# Patient Record
Sex: MALE | URBAN - METROPOLITAN AREA
[De-identification: names, ages, dates, MRNs, and addresses within clinical notes are randomized per-mention and may not be internally consistent; named-entity substitution may affect disease eponyms.]

---

## 2017-03-14 ENCOUNTER — EMPLOYEE WELLNESS (OUTPATIENT)
Dept: OTHER | Age: 52
End: 2017-03-14

## 2017-03-14 LAB
CHOLESTEROL/HDL RATIO: 3.7
CHOLESTEROL: 182 MG/DL
GLUCOSE BLD-MCNC: 95 MG/DL (ref 70–99)
HDLC SERPL-MCNC: 49 MG/DL
LDL CHOLESTEROL: 106 MG/DL (ref 0–130)
PATIENT FASTING?: YES
TRIGL SERPL-MCNC: 135 MG/DL
VLDLC SERPL CALC-MCNC: NORMAL MG/DL (ref 1–30)

## 2017-09-11 RX ORDER — LISINOPRIL 20 MG/1
TABLET ORAL
Qty: 30 TABLET | Refills: 6 | Status: SHIPPED | OUTPATIENT
Start: 2017-09-11 | End: 2018-03-12 | Stop reason: SDUPTHER

## 2018-03-06 DIAGNOSIS — E78.5 HYPERLIPIDEMIA, UNSPECIFIED HYPERLIPIDEMIA TYPE: ICD-10-CM

## 2018-03-06 RX ORDER — ATORVASTATIN CALCIUM 20 MG/1
TABLET, FILM COATED ORAL
Qty: 90 TABLET | Refills: 1 | OUTPATIENT
Start: 2018-03-06

## 2018-03-06 RX ORDER — LISINOPRIL 20 MG/1
TABLET ORAL
Qty: 30 TABLET | Refills: 6 | OUTPATIENT
Start: 2018-03-06

## 2018-03-12 ENCOUNTER — OFFICE VISIT (OUTPATIENT)
Dept: FAMILY MEDICINE CLINIC | Age: 53
End: 2018-03-12
Payer: COMMERCIAL

## 2018-03-12 VITALS
DIASTOLIC BLOOD PRESSURE: 80 MMHG | WEIGHT: 184 LBS | OXYGEN SATURATION: 98 % | HEIGHT: 76 IN | TEMPERATURE: 98.2 F | HEART RATE: 89 BPM | BODY MASS INDEX: 22.41 KG/M2 | SYSTOLIC BLOOD PRESSURE: 124 MMHG

## 2018-03-12 DIAGNOSIS — M19.042 PRIMARY OSTEOARTHRITIS OF BOTH HANDS: ICD-10-CM

## 2018-03-12 DIAGNOSIS — Z71.6 TOBACCO ABUSE COUNSELING: ICD-10-CM

## 2018-03-12 DIAGNOSIS — E78.2 MIXED HYPERLIPIDEMIA: ICD-10-CM

## 2018-03-12 DIAGNOSIS — M19.041 PRIMARY OSTEOARTHRITIS OF BOTH HANDS: ICD-10-CM

## 2018-03-12 DIAGNOSIS — E78.5 HYPERLIPIDEMIA, UNSPECIFIED HYPERLIPIDEMIA TYPE: ICD-10-CM

## 2018-03-12 DIAGNOSIS — Z12.5 PROSTATE CANCER SCREENING: ICD-10-CM

## 2018-03-12 DIAGNOSIS — I10 ESSENTIAL HYPERTENSION: Primary | ICD-10-CM

## 2018-03-12 PROCEDURE — 99406 BEHAV CHNG SMOKING 3-10 MIN: CPT | Performed by: PHYSICIAN ASSISTANT

## 2018-03-12 PROCEDURE — 99213 OFFICE O/P EST LOW 20 MIN: CPT | Performed by: PHYSICIAN ASSISTANT

## 2018-03-12 RX ORDER — IBUPROFEN 800 MG/1
800 TABLET ORAL EVERY 8 HOURS PRN
Qty: 90 TABLET | Refills: 0 | Status: CANCELLED | OUTPATIENT
Start: 2018-03-12 | End: 2018-06-10

## 2018-03-12 RX ORDER — ATORVASTATIN CALCIUM 20 MG/1
20 TABLET, FILM COATED ORAL DAILY
Qty: 90 TABLET | Refills: 1 | Status: SHIPPED | OUTPATIENT
Start: 2018-03-12 | End: 2019-01-28 | Stop reason: SDUPTHER

## 2018-03-12 RX ORDER — LISINOPRIL 20 MG/1
TABLET ORAL
Qty: 90 TABLET | Refills: 1 | Status: SHIPPED | OUTPATIENT
Start: 2018-03-12 | End: 2018-04-16 | Stop reason: SDUPTHER

## 2018-03-12 ASSESSMENT — PATIENT HEALTH QUESTIONNAIRE - PHQ9
1. LITTLE INTEREST OR PLEASURE IN DOING THINGS: 0
SUM OF ALL RESPONSES TO PHQ9 QUESTIONS 1 & 2: 0
2. FEELING DOWN, DEPRESSED OR HOPELESS: 0
SUM OF ALL RESPONSES TO PHQ QUESTIONS 1-9: 0

## 2018-03-12 ASSESSMENT — ENCOUNTER SYMPTOMS
CONSTIPATION: 0
VOMITING: 0
SHORTNESS OF BREATH: 0
DIARRHEA: 0
WHEEZING: 0
COLOR CHANGE: 0
COUGH: 0
ABDOMINAL PAIN: 0
NAUSEA: 0

## 2018-03-12 NOTE — PROGRESS NOTES
[address] [address]  Dept: [phone]  Dept Fax: [phone]    Giselle Juarez is a 47 y.o. male who presents today for his medical conditions/complaints as noted below. Giselle Juarez is c/o of   Chief Complaint   Patient presents with    Hypertension     Patient here for follow up on HTN         HPI:     Hypertension   This is a chronic problem. The current episode started more than 1 year ago. The problem is unchanged. The problem is controlled. Pertinent negatives include no anxiety, chest pain, palpitations, peripheral edema or shortness of breath. Risk factors for coronary artery disease include male gender and family history. Past treatments include ACE inhibitors. The current treatment provides significant improvement. patient states feeling well  Continues to smoke over 1 ppd  He reports feeling well  Working 7 days a week  He has been having pain in the hands bilaterally due to working with hands daily.  Requesting cream to use for pain    No results found for: LABA1C          ( goal A1C is < 7)   No results found for: LABMICR  LDL Cholesterol (mg/dL)   Date Value   09/15/2015 139 (H)   10/14/2014 101   05/14/2014 132 (H)       (goal LDL is <100)   AST (U/L)   Date Value   09/17/2015 21     ALT (U/L)   Date Value   09/17/2015 25     BUN (mg/dL)   Date Value   09/17/2015 14     BP Readings from Last 3 Encounters:   03/12/18 124/80   09/13/16 124/84   03/17/16 132/84          (goal 120/80)    Past Medical History:   Diagnosis Date    Hyperlipidemia     Hypertension       Past Surgical History:   Procedure Laterality Date    ROTATOR CUFF REPAIR  2011       Family History   Problem Relation Age of Onset    Alzheimer's Disease Mother        Social History   Substance Use Topics    Smoking status: Current Every Day Smoker     Packs/day: 0.50     Years: 15.00     Types: Cigarettes    Smokeless tobacco: Never Used   [facility] Alcohol use Yes      Comment: socially      Current Outpatient Prescriptions   Medication Sig Dispense Refill    atorvastatin (LIPITOR) 20 MG tablet Take 1 tablet by mouth daily 90 tablet 1    lisinopril (PRINIVIL;ZESTRIL) 20 MG tablet TAKE 1 TABLET BY MOUTH ONCE DAILY. 90 tablet 1    diclofenac sodium (VOLTAREN) 1 % GEL Apply 2 g topically 4 times daily as needed for Pain For upper extremity, use 2 gm QID. For lower extremity, use 4 gm QID. 10 Tube 1     No current facility-administered medications for this visit. No Known Allergies    Health Maintenance   Topic Date Due    Hepatitis C screen  1965    HIV screen  09/26/1979    DTaP/Tdap/Td vaccine (1 - Tdap) 09/26/1983    Shingles Vaccine (1 of 2 - 2 Dose Series) 09/26/2014    Potassium monitoring  09/17/2016    Creatinine monitoring  09/17/2016    Flu vaccine (1) 09/01/2017    Lipid screen  09/15/2020    Colon cancer screen colonoscopy  09/17/2020    Pneumococcal med risk  Completed       Subjective:      Review of Systems   Constitutional: Negative for activity change, appetite change, fatigue, fever and unexpected weight change. /80   Pulse 89   Temp 98.2 °F (36.8 °C) (Oral)   Ht 6' 4\" (1.93 m)   Wt 184 lb (83.5 kg)   SpO2 98%   BMI 22.40 kg/m²    Respiratory: Negative for cough, shortness of breath and wheezing. Cardiovascular: Negative for chest pain, palpitations and leg swelling. Gastrointestinal: Negative for abdominal pain, constipation, diarrhea, nausea and vomiting. Genitourinary: Negative for dysuria. Musculoskeletal: Positive for arthralgias. Skin: Negative for color change, pallor, rash and wound. Neurological: Negative for weakness. Hematological: Negative for adenopathy. Psychiatric/Behavioral: Negative for sleep disturbance. The patient is not nervous/anxious. Objective:     Physical Exam   Constitutional: He is oriented to person, place, and time.  He appears well-developed and

## 2018-03-20 VITALS — WEIGHT: 191 LBS

## 2018-04-16 ENCOUNTER — PATIENT MESSAGE (OUTPATIENT)
Dept: FAMILY MEDICINE CLINIC | Age: 53
End: 2018-04-16

## 2018-04-16 DIAGNOSIS — M19.042 PRIMARY OSTEOARTHRITIS OF BOTH HANDS: ICD-10-CM

## 2018-04-16 DIAGNOSIS — M19.041 PRIMARY OSTEOARTHRITIS OF BOTH HANDS: ICD-10-CM

## 2018-04-16 DIAGNOSIS — I10 ESSENTIAL HYPERTENSION: ICD-10-CM

## 2018-04-16 RX ORDER — LISINOPRIL 20 MG/1
TABLET ORAL
Qty: 90 TABLET | Refills: 1 | Status: SHIPPED | OUTPATIENT
Start: 2018-04-16 | End: 2018-07-31 | Stop reason: SDUPTHER

## 2018-04-17 ENCOUNTER — HOSPITAL ENCOUNTER (OUTPATIENT)
Age: 53
Discharge: HOME | End: 2018-04-17
Payer: COMMERCIAL

## 2018-04-17 DIAGNOSIS — E78.5 HYPERLIPIDEMIA, UNSPECIFIED HYPERLIPIDEMIA TYPE: ICD-10-CM

## 2018-04-17 DIAGNOSIS — E78.2 MIXED HYPERLIPIDEMIA: ICD-10-CM

## 2018-04-17 DIAGNOSIS — Z12.5 PROSTATE CANCER SCREENING: ICD-10-CM

## 2018-04-17 LAB
ABSOLUTE EOS #: 0.2 K/UL (ref 0–0.4)
ABSOLUTE IMMATURE GRANULOCYTE: ABNORMAL K/UL (ref 0–0.3)
ABSOLUTE LYMPH #: 2.6 K/UL (ref 1–4.8)
ABSOLUTE MONO #: 0.7 K/UL (ref 0.2–0.8)
ALBUMIN SERPL-MCNC: 4.4 G/DL (ref 3.5–5.2)
ALBUMIN/GLOBULIN RATIO: ABNORMAL (ref 1–2.5)
ALP BLD-CCNC: 82 U/L (ref 40–129)
ALT SERPL-CCNC: 22 U/L (ref 5–41)
ANION GAP SERPL CALCULATED.3IONS-SCNC: 15 MMOL/L (ref 9–17)
AST SERPL-CCNC: 18 U/L
BASOPHILS # BLD: 0 % (ref 0–2)
BASOPHILS ABSOLUTE: 0 K/UL (ref 0–0.2)
BILIRUB SERPL-MCNC: 0.69 MG/DL (ref 0.3–1.2)
BUN BLDV-MCNC: 15 MG/DL (ref 6–20)
BUN/CREAT BLD: 19 (ref 9–20)
CALCIUM SERPL-MCNC: 9.4 MG/DL (ref 8.6–10.4)
CHLORIDE BLD-SCNC: 101 MMOL/L (ref 98–107)
CHOLESTEROL/HDL RATIO: 3.5
CHOLESTEROL: 184 MG/DL
CO2: 23 MMOL/L (ref 20–31)
CREAT SERPL-MCNC: 0.79 MG/DL (ref 0.7–1.2)
DIFFERENTIAL TYPE: ABNORMAL
EOSINOPHILS RELATIVE PERCENT: 2 % (ref 1–4)
GFR AFRICAN AMERICAN: >60 ML/MIN
GFR NON-AFRICAN AMERICAN: >60 ML/MIN
GFR SERPL CREATININE-BSD FRML MDRD: ABNORMAL ML/MIN/{1.73_M2}
GFR SERPL CREATININE-BSD FRML MDRD: ABNORMAL ML/MIN/{1.73_M2}
GLUCOSE BLD-MCNC: 104 MG/DL (ref 70–99)
HCT VFR BLD CALC: 45.2 % (ref 41–53)
HDLC SERPL-MCNC: 52 MG/DL
HEMOGLOBIN: 15.4 G/DL (ref 13.5–17.5)
IMMATURE GRANULOCYTES: ABNORMAL %
LDL CHOLESTEROL: 98 MG/DL (ref 0–130)
LYMPHOCYTES # BLD: 21 % (ref 24–44)
MCH RBC QN AUTO: 33 PG (ref 26–34)
MCHC RBC AUTO-ENTMCNC: 34 G/DL (ref 31–37)
MCV RBC AUTO: 97 FL (ref 80–100)
MONOCYTES # BLD: 6 % (ref 1–7)
NRBC AUTOMATED: ABNORMAL PER 100 WBC
PDW BLD-RTO: 14.3 % (ref 11.5–14.5)
PLATELET # BLD: 320 K/UL (ref 130–400)
PLATELET ESTIMATE: ABNORMAL
PMV BLD AUTO: 8 FL (ref 6–12)
POTASSIUM SERPL-SCNC: 4.1 MMOL/L (ref 3.7–5.3)
PROSTATE SPECIFIC ANTIGEN: 0.99 UG/L
RBC # BLD: 4.66 M/UL (ref 4.5–5.9)
RBC # BLD: ABNORMAL 10*6/UL
SEG NEUTROPHILS: 71 % (ref 36–66)
SEGMENTED NEUTROPHILS ABSOLUTE COUNT: 9.2 K/UL (ref 1.8–7.7)
SODIUM BLD-SCNC: 139 MMOL/L (ref 135–144)
TOTAL PROTEIN: 7.2 G/DL (ref 6.4–8.3)
TRIGL SERPL-MCNC: 171 MG/DL
TSH SERPL DL<=0.05 MIU/L-ACNC: 2.44 MIU/L (ref 0.3–5)
VLDLC SERPL CALC-MCNC: ABNORMAL MG/DL (ref 1–30)
WBC # BLD: 12.8 K/UL (ref 3.5–11)
WBC # BLD: ABNORMAL 10*3/UL

## 2018-04-17 PROCEDURE — 80053 COMPREHEN METABOLIC PANEL: CPT

## 2018-04-17 PROCEDURE — 85025 COMPLETE CBC W/AUTO DIFF WBC: CPT

## 2018-04-17 PROCEDURE — 36415 COLL VENOUS BLD VENIPUNCTURE: CPT

## 2018-04-17 PROCEDURE — 84443 ASSAY THYROID STIM HORMONE: CPT

## 2018-04-17 PROCEDURE — G0103 PSA SCREENING: HCPCS

## 2018-04-17 PROCEDURE — 80061 LIPID PANEL: CPT

## 2018-05-10 ENCOUNTER — PATIENT MESSAGE (OUTPATIENT)
Dept: FAMILY MEDICINE CLINIC | Age: 53
End: 2018-05-10

## 2018-05-10 RX ORDER — MELOXICAM 7.5 MG/1
7.5 TABLET ORAL 2 TIMES DAILY PRN
Qty: 30 TABLET | Refills: 3 | Status: SHIPPED | OUTPATIENT
Start: 2018-05-10 | End: 2019-03-29

## 2018-07-31 DIAGNOSIS — I10 ESSENTIAL HYPERTENSION: ICD-10-CM

## 2018-07-31 RX ORDER — LISINOPRIL 20 MG/1
TABLET ORAL
Qty: 90 TABLET | Refills: 1 | Status: SHIPPED | OUTPATIENT
Start: 2018-07-31 | End: 2019-07-16 | Stop reason: SDUPTHER

## 2018-09-06 ENCOUNTER — TELEPHONE (OUTPATIENT)
Dept: FAMILY MEDICINE CLINIC | Age: 53
End: 2018-09-06

## 2018-10-17 ENCOUNTER — TELEPHONE (OUTPATIENT)
Dept: FAMILY MEDICINE CLINIC | Age: 53
End: 2018-10-17

## 2019-01-28 DIAGNOSIS — E78.2 MIXED HYPERLIPIDEMIA: ICD-10-CM

## 2019-01-28 RX ORDER — ATORVASTATIN CALCIUM 20 MG/1
TABLET, FILM COATED ORAL
Qty: 90 TABLET | Refills: 0 | Status: SHIPPED | OUTPATIENT
Start: 2019-01-28 | End: 2019-05-01 | Stop reason: SDUPTHER

## 2019-03-29 ENCOUNTER — OFFICE VISIT (OUTPATIENT)
Dept: FAMILY MEDICINE CLINIC | Age: 54
End: 2019-03-29
Payer: COMMERCIAL

## 2019-03-29 VITALS
BODY MASS INDEX: 23.87 KG/M2 | WEIGHT: 186 LBS | TEMPERATURE: 98.7 F | DIASTOLIC BLOOD PRESSURE: 80 MMHG | HEIGHT: 74 IN | SYSTOLIC BLOOD PRESSURE: 130 MMHG | OXYGEN SATURATION: 97 % | HEART RATE: 76 BPM

## 2019-03-29 DIAGNOSIS — F17.200 TOBACCO DEPENDENCY: ICD-10-CM

## 2019-03-29 DIAGNOSIS — J01.90 ACUTE BACTERIAL SINUSITIS: Primary | ICD-10-CM

## 2019-03-29 DIAGNOSIS — B96.89 ACUTE BACTERIAL SINUSITIS: Primary | ICD-10-CM

## 2019-03-29 PROCEDURE — 99213 OFFICE O/P EST LOW 20 MIN: CPT | Performed by: NURSE PRACTITIONER

## 2019-03-29 RX ORDER — GUAIFENESIN 600 MG/1
600 TABLET, EXTENDED RELEASE ORAL 2 TIMES DAILY
Qty: 30 TABLET | Refills: 0 | Status: SHIPPED | OUTPATIENT
Start: 2019-03-29 | End: 2019-04-13

## 2019-03-29 RX ORDER — AZITHROMYCIN 250 MG/1
TABLET, FILM COATED ORAL
Qty: 1 PACKET | Refills: 0 | Status: SHIPPED | OUTPATIENT
Start: 2019-03-29 | End: 2019-07-24

## 2019-03-29 RX ORDER — FLUTICASONE PROPIONATE 50 MCG
2 SPRAY, SUSPENSION (ML) NASAL DAILY
Qty: 1 BOTTLE | Refills: 0 | Status: SHIPPED | OUTPATIENT
Start: 2019-03-29 | End: 2019-07-24

## 2019-03-29 ASSESSMENT — ENCOUNTER SYMPTOMS
SORE THROAT: 0
SINUS PRESSURE: 1
HOARSE VOICE: 0
SHORTNESS OF BREATH: 0
SWOLLEN GLANDS: 0
COUGH: 1

## 2019-03-29 ASSESSMENT — PATIENT HEALTH QUESTIONNAIRE - PHQ9
SUM OF ALL RESPONSES TO PHQ9 QUESTIONS 1 & 2: 0
1. LITTLE INTEREST OR PLEASURE IN DOING THINGS: 0
SUM OF ALL RESPONSES TO PHQ QUESTIONS 1-9: 0
SUM OF ALL RESPONSES TO PHQ QUESTIONS 1-9: 0
2. FEELING DOWN, DEPRESSED OR HOPELESS: 0

## 2019-05-01 DIAGNOSIS — E78.2 MIXED HYPERLIPIDEMIA: ICD-10-CM

## 2019-05-01 RX ORDER — ATORVASTATIN CALCIUM 20 MG/1
TABLET, FILM COATED ORAL
Qty: 90 TABLET | Refills: 0 | Status: SHIPPED | OUTPATIENT
Start: 2019-05-01 | End: 2019-07-16 | Stop reason: SDUPTHER

## 2019-05-22 ENCOUNTER — OFFICE VISIT (OUTPATIENT)
Dept: PODIATRY | Age: 54
End: 2019-05-22
Payer: COMMERCIAL

## 2019-05-22 VITALS — BODY MASS INDEX: 23.25 KG/M2 | HEIGHT: 75 IN | WEIGHT: 187 LBS | RESPIRATION RATE: 16 BRPM

## 2019-05-22 DIAGNOSIS — M79.604 PAIN IN BOTH LOWER EXTREMITIES: ICD-10-CM

## 2019-05-22 DIAGNOSIS — R26.2 DIFFICULTY WALKING: ICD-10-CM

## 2019-05-22 DIAGNOSIS — D23.72 BENIGN NEOPLASM OF SKIN OF LEFT FOOT: ICD-10-CM

## 2019-05-22 DIAGNOSIS — D23.71 BENIGN NEOPLASM OF SKIN OF RIGHT FOOT: Primary | ICD-10-CM

## 2019-05-22 DIAGNOSIS — M79.605 PAIN IN BOTH LOWER EXTREMITIES: ICD-10-CM

## 2019-05-22 PROCEDURE — 99203 OFFICE O/P NEW LOW 30 MIN: CPT | Performed by: PODIATRIST

## 2019-05-22 PROCEDURE — 17110 DESTRUCTION B9 LES UP TO 14: CPT | Performed by: PODIATRIST

## 2019-05-22 NOTE — PROGRESS NOTES
2011       Family History   Problem Relation Age of Onset    Alzheimer's Disease Mother        Social History     Tobacco Use    Smoking status: Current Every Day Smoker     Packs/day: 0.50     Years: 15.00     Pack years: 7.50     Types: Cigarettes    Smokeless tobacco: Never Used   Substance Use Topics    Alcohol use: Yes     Comment: socially       Review of Systems    Review of Systems:   History obtained from chart review and the patient  General ROS: negative for - chills, fatigue, fever, night sweats or weight gain  Constitutional: Negative for chills, diaphoresis, fatigue, fever and unexpected weight change. Musculoskeletal: Positive for arthralgias, gait problem and joint swelling. Neurological ROS: negative for - behavioral changes, confusion, headaches or seizures. Negative for weakness and numbness. Dermatological ROS: negative for - mole changes, rash  Cardiovascular: Negative for leg swelling. Gastrointestinal: Negative for constipation, diarrhea, nausea and vomiting. Lower Extremity Physical Examination:   Vitals:   Vitals:    05/22/19 1444   Resp: 16     General: AAO x 3 in NAD. Dermatologic Exam:  Skin lesion/ulceration Absent . Skin No rashes or nodules noted. .       Musculoskeletal:     1st MPJ ROM decreased, Bilateral.  Muscle strength 5/5, Bilateral.  Pain present upon palpation of plantar skin lesions, angelique. Medial longitudinal arch, Bilateral WNL.   Ankle ROM WNL,Bilateral.    Dorsally contracted digits absent digits 1-5 Bilateral.     Vascular: DP and PT pulses palpable 2/4, Bilateral.  CFT <3 seconds, Bilateral.  Hair growth present to the level of the digits, Bilateral.  Edema absent, Bilateral.  Varicosities absent, Bilateral. Erythema absent, Bilateral    Neurological: Sensation intact to light touch to level of digits, Bilateral.  Protective sensation intact 10/10 sites via 5.07/10g [place]-Gt Monofilament, Bilateral.  negative Tinel's, Bilateral. negative Valleix sign, Bilateral.      Integument: Warm, dry, supple, Bilateral.  Benign skin neoplasm sub 1st and 5th MTH, angelique with petechiae. Open lesion absent, Bilateral.  Interdigital maceration absent to web spaces 1-4, Bilateral.  Nails are normal in length, thickness and color 1-5 bilateral.  Fissures absent, Bilateral.       Asessment: Patient is a 46 y.o. male with:   Diagnosis Orders   1. Benign neoplasm of skin of right foot  69208 - TX DESTRUCTION BENIGN LESIONS UP TO 14   2. Benign neoplasm of skin of left foot  44090 - TX DESTRUCTION BENIGN LESIONS UP TO 14   3. Pain in both lower extremities  68030 - TX DESTRUCTION BENIGN LESIONS UP TO 14   4. Difficulty walking  15746 - TX DESTRUCTION BENIGN LESIONS UP TO 14         Plan: Patient examined and evaluated. Current condition and treatment options discussed in detail. Discussed conservative and surgical options with the patient. The lesion was partially excised and Pyrogallic acid was applied under occlusion. The patient will leave in place for 24-48 hours and than remove. The patient tolerated the procedure well and without complication. Check for orthotic coverage. Dispensed powersteps today. Verbal and written instructions given to patient. Contact office with any questions/problems/concerns.   RTC in 2month(s).    5/22/2019    Electronically signed by Fred Osuna DPM on 5/22/2019 at 2:57 PM  5/22/2019

## 2019-06-26 ENCOUNTER — OFFICE VISIT (OUTPATIENT)
Dept: PODIATRY | Age: 54
End: 2019-06-26
Payer: COMMERCIAL

## 2019-06-26 DIAGNOSIS — M79.605 PAIN IN BOTH LOWER EXTREMITIES: Primary | ICD-10-CM

## 2019-06-26 DIAGNOSIS — M79.604 PAIN IN BOTH LOWER EXTREMITIES: Primary | ICD-10-CM

## 2019-06-26 DIAGNOSIS — R26.2 DIFFICULTY WALKING: ICD-10-CM

## 2019-06-26 PROCEDURE — 99999 PR OFFICE/OUTPT VISIT,PROCEDURE ONLY: CPT | Performed by: PODIATRIST

## 2019-06-26 PROCEDURE — L3020 FOOT LONGITUD/METATARSAL SUP: HCPCS | Performed by: PODIATRIST

## 2019-07-09 ENCOUNTER — OFFICE VISIT (OUTPATIENT)
Dept: PODIATRY | Age: 54
End: 2019-07-09

## 2019-07-09 DIAGNOSIS — M79.604 PAIN IN BOTH LOWER EXTREMITIES: Primary | ICD-10-CM

## 2019-07-09 DIAGNOSIS — R26.2 DIFFICULTY WALKING: ICD-10-CM

## 2019-07-09 DIAGNOSIS — M79.605 PAIN IN BOTH LOWER EXTREMITIES: Primary | ICD-10-CM

## 2019-07-09 PROCEDURE — 99999 PR OFFICE/OUTPT VISIT,PROCEDURE ONLY: CPT | Performed by: PODIATRIST

## 2019-07-09 NOTE — PROGRESS NOTES
Pt came into  custom orthotics. Given instructions on breaking in the orthotics gradually until could wear  For whole day.

## 2019-07-16 ENCOUNTER — PATIENT MESSAGE (OUTPATIENT)
Dept: FAMILY MEDICINE CLINIC | Age: 54
End: 2019-07-16

## 2019-07-16 ENCOUNTER — E-VISIT (OUTPATIENT)
Dept: FAMILY MEDICINE CLINIC | Age: 54
End: 2019-07-16
Payer: COMMERCIAL

## 2019-07-16 ENCOUNTER — HOSPITAL ENCOUNTER (OUTPATIENT)
Age: 54
Setting detail: SPECIMEN
Discharge: HOME | End: 2019-07-16
Payer: COMMERCIAL

## 2019-07-16 ENCOUNTER — EMPLOYEE WELLNESS (OUTPATIENT)
Dept: OTHER | Age: 54
End: 2019-07-16

## 2019-07-16 DIAGNOSIS — Z12.5 PROSTATE CANCER SCREENING: ICD-10-CM

## 2019-07-16 DIAGNOSIS — E78.2 MIXED HYPERLIPIDEMIA: ICD-10-CM

## 2019-07-16 DIAGNOSIS — I10 ESSENTIAL HYPERTENSION: Primary | ICD-10-CM

## 2019-07-16 DIAGNOSIS — D72.829 LEUKOCYTOSIS, UNSPECIFIED TYPE: Primary | ICD-10-CM

## 2019-07-16 DIAGNOSIS — I10 ESSENTIAL HYPERTENSION: ICD-10-CM

## 2019-07-16 DIAGNOSIS — E78.5 DYSLIPIDEMIA: ICD-10-CM

## 2019-07-16 LAB
ABSOLUTE EOS #: 0.32 K/UL (ref 0–0.44)
ABSOLUTE IMMATURE GRANULOCYTE: 0.06 K/UL (ref 0–0.3)
ABSOLUTE LYMPH #: 3.47 K/UL (ref 1.1–3.7)
ABSOLUTE MONO #: 1.18 K/UL (ref 0.1–1.2)
ALBUMIN SERPL-MCNC: 4.2 G/DL (ref 3.5–5.2)
ALBUMIN/GLOBULIN RATIO: 1.4 (ref 1–2.5)
ALP BLD-CCNC: 91 U/L (ref 40–129)
ALT SERPL-CCNC: 31 U/L (ref 5–41)
ANION GAP SERPL CALCULATED.3IONS-SCNC: 18 MMOL/L (ref 9–17)
AST SERPL-CCNC: 31 U/L
BASOPHILS # BLD: 1 % (ref 0–2)
BASOPHILS ABSOLUTE: 0.06 K/UL (ref 0–0.2)
BILIRUB SERPL-MCNC: <0.1 MG/DL (ref 0.3–1.2)
BUN BLDV-MCNC: 27 MG/DL (ref 6–20)
BUN/CREAT BLD: ABNORMAL (ref 9–20)
CALCIUM SERPL-MCNC: 9.5 MG/DL (ref 8.6–10.4)
CHLORIDE BLD-SCNC: 105 MMOL/L (ref 98–107)
CHOLESTEROL, TOTAL: 192 MG/DL
CHOLESTEROL/HDL RATIO: 5.2
CHOLESTEROL/HDL RATIO: 5.2
CHOLESTEROL: 192 MG/DL
CO2: 16 MMOL/L (ref 20–31)
CREAT SERPL-MCNC: 0.8 MG/DL (ref 0.7–1.2)
DIFFERENTIAL TYPE: ABNORMAL
EOSINOPHILS RELATIVE PERCENT: 3 % (ref 1–4)
GFR AFRICAN AMERICAN: >60 ML/MIN
GFR NON-AFRICAN AMERICAN: >60 ML/MIN
GFR SERPL CREATININE-BSD FRML MDRD: ABNORMAL ML/MIN/{1.73_M2}
GFR SERPL CREATININE-BSD FRML MDRD: ABNORMAL ML/MIN/{1.73_M2}
GLUCOSE BLD-MCNC: 95 MG/DL (ref 70–99)
GLUCOSE BLD-MCNC: 95 MG/DL (ref 70–99)
HCT VFR BLD CALC: 43.4 % (ref 40.7–50.3)
HDLC SERPL-MCNC: 37 MG/DL
HDLC SERPL-MCNC: 37 MG/DL (ref 35–70)
HEMOGLOBIN: 14.2 G/DL (ref 13–17)
IMMATURE GRANULOCYTES: 1 %
LDL CHOLESTEROL CALCULATED: NORMAL MG/DL (ref 0–160)
LDL CHOLESTEROL DIRECT: 89 MG/DL
LDL CHOLESTEROL: ABNORMAL MG/DL (ref 0–130)
LYMPHOCYTES # BLD: 27 % (ref 24–43)
MCH RBC QN AUTO: 32.9 PG (ref 25.2–33.5)
MCHC RBC AUTO-ENTMCNC: 32.7 G/DL (ref 28.4–34.8)
MCV RBC AUTO: 100.5 FL (ref 82.6–102.9)
MONOCYTES # BLD: 9 % (ref 3–12)
NRBC AUTOMATED: 0 PER 100 WBC
PATIENT FASTING?: YES
PDW BLD-RTO: 14.6 % (ref 11.8–14.4)
PLATELET # BLD: 291 K/UL (ref 138–453)
PLATELET ESTIMATE: ABNORMAL
PMV BLD AUTO: 10.6 FL (ref 8.1–13.5)
POTASSIUM SERPL-SCNC: 4.4 MMOL/L (ref 3.7–5.3)
PROSTATE SPECIFIC ANTIGEN: 1.04 UG/L
RBC # BLD: 4.32 M/UL (ref 4.21–5.77)
RBC # BLD: ABNORMAL 10*6/UL
SEG NEUTROPHILS: 59 % (ref 36–65)
SEGMENTED NEUTROPHILS ABSOLUTE COUNT: 7.82 K/UL (ref 1.5–8.1)
SODIUM BLD-SCNC: 139 MMOL/L (ref 135–144)
TOTAL PROTEIN: 7.1 G/DL (ref 6.4–8.3)
TRIGL SERPL-MCNC: 516 MG/DL
TRIGL SERPL-MCNC: 516 MG/DL
VLDLC SERPL CALC-MCNC: ABNORMAL MG/DL (ref 1–30)
VLDLC SERPL CALC-MCNC: NORMAL MG/DL
WBC # BLD: 12.9 K/UL (ref 3.5–11.3)
WBC # BLD: ABNORMAL 10*3/UL

## 2019-07-16 PROCEDURE — 98969 PR NONPHYSICIAN ONLINE ASSESSMENT AND MANAGEMENT: CPT | Performed by: PHYSICIAN ASSISTANT

## 2019-07-16 RX ORDER — ATORVASTATIN CALCIUM 20 MG/1
TABLET, FILM COATED ORAL
Qty: 90 TABLET | Refills: 1 | Status: SHIPPED | OUTPATIENT
Start: 2019-07-16 | End: 2019-09-10 | Stop reason: SDUPTHER

## 2019-07-16 RX ORDER — LISINOPRIL 20 MG/1
TABLET ORAL
Qty: 90 TABLET | Refills: 1 | Status: SHIPPED | OUTPATIENT
Start: 2019-07-16 | End: 2019-09-12 | Stop reason: SDUPTHER

## 2019-07-17 LAB
ABSOLUTE RETIC #: 0.06 M/UL (ref 0.03–0.08)
IMMATURE RETIC FRACT: 9.7 % (ref 2.7–18.3)
PATHOLOGIST REVIEW: NORMAL
RETIC %: 1.5 % (ref 0.5–1.9)
RETIC HEMOGLOBIN: 36.7 PG (ref 28.2–35.7)
SURGICAL PATHOLOGY REPORT: NORMAL

## 2019-07-22 VITALS — WEIGHT: 173 LBS

## 2019-07-24 ENCOUNTER — OFFICE VISIT (OUTPATIENT)
Dept: FAMILY MEDICINE CLINIC | Age: 54
End: 2019-07-24
Payer: COMMERCIAL

## 2019-07-24 VITALS
HEIGHT: 75 IN | OXYGEN SATURATION: 98 % | DIASTOLIC BLOOD PRESSURE: 80 MMHG | BODY MASS INDEX: 23 KG/M2 | HEART RATE: 74 BPM | WEIGHT: 185 LBS | TEMPERATURE: 96.9 F | SYSTOLIC BLOOD PRESSURE: 133 MMHG

## 2019-07-24 DIAGNOSIS — S46.211A RUPTURE OF RIGHT BICEPS TENDON, INITIAL ENCOUNTER: ICD-10-CM

## 2019-07-24 DIAGNOSIS — E78.2 MIXED HYPERLIPIDEMIA: Primary | ICD-10-CM

## 2019-07-24 PROCEDURE — 99213 OFFICE O/P EST LOW 20 MIN: CPT | Performed by: PHYSICIAN ASSISTANT

## 2019-07-24 RX ORDER — CHLORAL HYDRATE 500 MG
3000 CAPSULE ORAL 3 TIMES DAILY
COMMUNITY

## 2019-07-24 ASSESSMENT — ENCOUNTER SYMPTOMS
COUGH: 0
ABDOMINAL PAIN: 0
COLOR CHANGE: 0
NAUSEA: 0
DIARRHEA: 0
CONSTIPATION: 0
WHEEZING: 0
VOMITING: 0
SHORTNESS OF BREATH: 0

## 2019-07-30 DIAGNOSIS — E78.2 MIXED HYPERLIPIDEMIA: ICD-10-CM

## 2019-09-10 DIAGNOSIS — E78.2 MIXED HYPERLIPIDEMIA: ICD-10-CM

## 2019-09-10 RX ORDER — ATORVASTATIN CALCIUM 20 MG/1
TABLET, FILM COATED ORAL
Qty: 90 TABLET | Refills: 0 | Status: SHIPPED | OUTPATIENT
Start: 2019-09-10 | End: 2020-02-03

## 2019-09-12 DIAGNOSIS — I10 ESSENTIAL HYPERTENSION: ICD-10-CM

## 2019-09-12 RX ORDER — LISINOPRIL 20 MG/1
TABLET ORAL
Qty: 90 TABLET | Refills: 0 | Status: SHIPPED | OUTPATIENT
Start: 2019-09-12 | End: 2020-02-02 | Stop reason: SDUPTHER

## 2020-02-03 RX ORDER — LISINOPRIL 20 MG/1
TABLET ORAL
Qty: 90 TABLET | Refills: 0 | Status: SHIPPED | OUTPATIENT
Start: 2020-02-03 | End: 2020-02-03

## 2020-02-03 RX ORDER — ATORVASTATIN CALCIUM 20 MG/1
TABLET, FILM COATED ORAL
Qty: 90 TABLET | Refills: 0 | Status: SHIPPED | OUTPATIENT
Start: 2020-02-03 | End: 2020-02-03

## 2020-02-03 RX ORDER — LISINOPRIL 20 MG/1
TABLET ORAL
Qty: 90 TABLET | Refills: 0 | Status: SHIPPED | OUTPATIENT
Start: 2020-02-03 | End: 2020-02-04

## 2020-02-03 RX ORDER — ATORVASTATIN CALCIUM 20 MG/1
TABLET, FILM COATED ORAL
Qty: 90 TABLET | Refills: 0 | Status: SHIPPED | OUTPATIENT
Start: 2020-02-03 | End: 2020-04-17

## 2020-02-04 RX ORDER — LISINOPRIL 20 MG/1
TABLET ORAL
Qty: 90 TABLET | Refills: 0 | Status: SHIPPED | OUTPATIENT
Start: 2020-02-04 | End: 2020-04-17

## 2020-04-21 RX ORDER — ATORVASTATIN CALCIUM 20 MG/1
TABLET, FILM COATED ORAL
Qty: 90 TABLET | Refills: 0 | Status: SHIPPED | OUTPATIENT
Start: 2020-04-21 | End: 2020-04-22 | Stop reason: SDUPTHER

## 2020-04-21 RX ORDER — LISINOPRIL 20 MG/1
TABLET ORAL
Qty: 90 TABLET | Refills: 0 | Status: SHIPPED | OUTPATIENT
Start: 2020-04-21 | End: 2020-04-22 | Stop reason: SDUPTHER

## 2020-04-22 ENCOUNTER — PATIENT MESSAGE (OUTPATIENT)
Dept: FAMILY MEDICINE CLINIC | Age: 55
End: 2020-04-22

## 2020-04-22 RX ORDER — LISINOPRIL 20 MG/1
TABLET ORAL
Qty: 90 TABLET | Refills: 1 | Status: SHIPPED | OUTPATIENT
Start: 2020-04-22 | End: 2020-09-28 | Stop reason: SDUPTHER

## 2020-04-22 RX ORDER — ATORVASTATIN CALCIUM 20 MG/1
TABLET, FILM COATED ORAL
Qty: 90 TABLET | Refills: 1 | Status: SHIPPED | OUTPATIENT
Start: 2020-04-22 | End: 2020-09-28 | Stop reason: SDUPTHER

## 2020-04-22 NOTE — TELEPHONE ENCOUNTER
From: Mary Jo Mathew  To: Sravani Chong PA-C  Sent: 4/22/2020 8:48 AM EDT  Subject: Prescription Question    Hi Sylwia, my insurance has changed, so my scripts now go to [facility]. I know you refilled yesterday, but they have not rec'd the order. And [facility] will NOT transfer the scripts. Could I please get a 90 day refill on both? Thanks for your help!!   [facility]

## 2020-08-29 ENCOUNTER — HOSPITAL ENCOUNTER (EMERGENCY)
Age: 55
Discharge: HOME | End: 2020-08-29
Attending: EMERGENCY MEDICINE
Payer: COMMERCIAL

## 2020-08-29 ENCOUNTER — APPOINTMENT (OUTPATIENT)
Dept: GENERAL RADIOLOGY | Age: 55
End: 2020-08-29
Payer: COMMERCIAL

## 2020-08-29 VITALS
HEIGHT: 76 IN | RESPIRATION RATE: 18 BRPM | HEART RATE: 82 BPM | DIASTOLIC BLOOD PRESSURE: 85 MMHG | SYSTOLIC BLOOD PRESSURE: 136 MMHG | WEIGHT: 200 LBS | BODY MASS INDEX: 24.36 KG/M2 | OXYGEN SATURATION: 98 % | TEMPERATURE: 98.2 F

## 2020-08-29 PROCEDURE — 99283 EMERGENCY DEPT VISIT LOW MDM: CPT

## 2020-08-29 PROCEDURE — 73030 X-RAY EXAM OF SHOULDER: CPT

## 2020-08-29 PROCEDURE — 6370000000 HC RX 637 (ALT 250 FOR IP): Performed by: EMERGENCY MEDICINE

## 2020-08-29 RX ORDER — OXYCODONE HYDROCHLORIDE AND ACETAMINOPHEN 5; 325 MG/1; MG/1
1 TABLET ORAL ONCE
Status: COMPLETED | OUTPATIENT
Start: 2020-08-29 | End: 2020-08-29

## 2020-08-29 RX ORDER — OXYCODONE HYDROCHLORIDE AND ACETAMINOPHEN 5; 325 MG/1; MG/1
1 TABLET ORAL EVERY 6 HOURS PRN
Qty: 10 TABLET | Refills: 0 | Status: SHIPPED | OUTPATIENT
Start: 2020-08-29 | End: 2020-09-01

## 2020-08-29 RX ADMIN — OXYCODONE HYDROCHLORIDE AND ACETAMINOPHEN 1 TABLET: 5; 325 TABLET ORAL at 16:49

## 2020-08-29 ASSESSMENT — PAIN SCALES - GENERAL
PAINLEVEL_OUTOF10: 8
PAINLEVEL_OUTOF10: 8

## 2020-08-29 ASSESSMENT — PAIN DESCRIPTION - LOCATION: LOCATION: SHOULDER

## 2020-08-29 ASSESSMENT — PAIN DESCRIPTION - ORIENTATION: ORIENTATION: LEFT

## 2020-08-29 ASSESSMENT — PAIN DESCRIPTION - FREQUENCY: FREQUENCY: CONTINUOUS

## 2020-08-29 ASSESSMENT — PAIN DESCRIPTION - PAIN TYPE: TYPE: ACUTE PAIN

## 2020-08-29 NOTE — ED PROVIDER NOTES
136/85   Pulse 82   Temp 98.2 °F (36.8 °C) (Oral)   Resp 18   Ht 6' 4\" (1.93 m)   Wt 200 lb (90.7 kg)   SpO2 98%   BMI 24.34 kg/m²    Physical Exam  Constitutional:       General: He is not in acute distress. Appearance: He is well-developed. HENT:      Head: Normocephalic. Eyes:      Pupils: Pupils are equal, round, and reactive to light. Cardiovascular:      Rate and Rhythm: Normal rate and regular rhythm. Heart sounds: Normal heart sounds. Pulmonary:      Effort: Pulmonary effort is normal. No respiratory distress. Breath sounds: Normal breath sounds. Abdominal:      General: Bowel sounds are normal.      Palpations: Abdomen is soft. Tenderness: There is no abdominal tenderness. Musculoskeletal:      Left shoulder: He exhibits decreased range of motion and tenderness. He exhibits no swelling, no deformity and no pain. Skin:     General: Skin is warm and dry. Neurological:      Mental Status: He is alert and oriented to person, place, and time. MEDICAL DECISION MAKIN-year-old male coming into the emergency room with left shoulder pain after a fall. Patient has no pain to the elbow or wrist.  He has normal capillary refill distally and good radial pulse. He does have significantly limited range of motion due to pain of the left shoulder. X-ray did not confirm any bony injury. Patient given sling and instructed to follow-up with orthopedic surgery. CRITICAL CARE:       PROCEDURES:    Procedures    DIAGNOSTIC RESULTS   EKG:All EKG's are interpreted by the Emergency Department Physician who either signs or Co-signs this chart in the absence of a cardiologist.        RADIOLOGY:All plain film, CT, MRI, and formal ultrasound images (except ED bedside ultrasound) are read by the radiologist, see reports below, unless otherwisenoted in MDM or here.   XR SHOULDER LEFT (MIN 2 VIEWS)   Final Result   No acute abnormality with chronic/postsurgical findings as

## 2020-09-03 ENCOUNTER — OFFICE VISIT (OUTPATIENT)
Dept: ORTHOPEDIC SURGERY | Age: 55
End: 2020-09-03
Payer: COMMERCIAL

## 2020-09-03 ENCOUNTER — HOSPITAL ENCOUNTER (OUTPATIENT)
Dept: MRI IMAGING | Age: 55
Discharge: HOME | End: 2020-09-05
Payer: COMMERCIAL

## 2020-09-03 VITALS
DIASTOLIC BLOOD PRESSURE: 79 MMHG | BODY MASS INDEX: 24.36 KG/M2 | SYSTOLIC BLOOD PRESSURE: 125 MMHG | HEART RATE: 78 BPM | WEIGHT: 200 LBS | HEIGHT: 76 IN

## 2020-09-03 PROCEDURE — G8420 CALC BMI NORM PARAMETERS: HCPCS | Performed by: ORTHOPAEDIC SURGERY

## 2020-09-03 PROCEDURE — 73221 MRI JOINT UPR EXTREM W/O DYE: CPT

## 2020-09-03 PROCEDURE — 4004F PT TOBACCO SCREEN RCVD TLK: CPT | Performed by: ORTHOPAEDIC SURGERY

## 2020-09-03 PROCEDURE — G8427 DOCREV CUR MEDS BY ELIG CLIN: HCPCS | Performed by: ORTHOPAEDIC SURGERY

## 2020-09-03 PROCEDURE — 99203 OFFICE O/P NEW LOW 30 MIN: CPT | Performed by: ORTHOPAEDIC SURGERY

## 2020-09-03 PROCEDURE — 3017F COLORECTAL CA SCREEN DOC REV: CPT | Performed by: ORTHOPAEDIC SURGERY

## 2020-09-03 ASSESSMENT — ENCOUNTER SYMPTOMS
APNEA: 0
ABDOMINAL PAIN: 0
COUGH: 0
SHORTNESS OF BREATH: 0
CONSTIPATION: 0
ABDOMINAL DISTENTION: 0
VOMITING: 0
DIARRHEA: 0
NAUSEA: 0
CHEST TIGHTNESS: 0
COLOR CHANGE: 0

## 2020-09-03 NOTE — PROGRESS NOTES
[address] AND SPORTS MEDICINE  [address] [place] [id number]  Dept: [phone]  Dept Fax: [phone]        Left Shoulder - Established Patient - New Complaint     Chief Complaint:     Chief Complaint   Patient presents with    Shoulder Injury     left shoulder pain, HX of Sx w Evelia Mohamud @ [facility] in 2011, [place] on 8/29/20     HPI:     Michael Mistry is a 53y.o. year old right hand dominant male that has had pain in the left shoulder for 5 days. As far as any trauma to the shoulder, the patient indicates that he fell on 08/29/2020 and he re-injured the shoulder and he states the shoulder feels like the rotator cuff is torn again. He also injured the shoulder the same way back in 2011 but it was a fall in the winter instead of the summer. The pain is worse at night and when doing overhead activities. Weakness of the shoulder has been noted. The pain restricts activities such as working his job. The pain does not seem to improve with time. The following medications have been tried: Advil and percocet. The patient has not had a corticosteroid injection. The patient has not tried physical therapy. The patient has had surgery in 2011 and it was a left shoulder arthroscopy with rotator cuff repair and it was done by Dr. Evelia Mohamud. The opposite shoulder is okay. Neck pain has not been present. Review of Systems   Constitutional: Positive for activity change. Negative for appetite change. Respiratory: Negative for apnea, cough, chest tightness and shortness of breath. Cardiovascular: Negative for chest pain, palpitations and leg swelling. Gastrointestinal: Negative for abdominal distention, abdominal pain, constipation, diarrhea, nausea and vomiting. Genitourinary: Negative for difficulty urinating, dysuria and hematuria. Musculoskeletal: Positive for arthralgias. Negative for gait problem, joint swelling and myalgias.    Skin: Negative for color change and rash. Neurological: Negative for dizziness, weakness, numbness and headaches. Psychiatric/Behavioral: Negative for sleep disturbance. Past Medical History:    Past Medical History:   Diagnosis Date    Hyperlipidemia     Hypertension      Past Surgical History:    Past Surgical History:   Procedure Laterality Date    ROTATOR CUFF REPAIR  2011     Current Medications:   Current Outpatient Medications   Medication Sig Dispense Refill    lisinopril (PRINIVIL;ZESTRIL) 20 MG tablet TAKE 1 TABLET BY MOUTH ONE TIME A DAY 90 tablet 1    atorvastatin (LIPITOR) 20 MG tablet TAKE 1 TABLET BY MOUTH ONE TIME A DAY 90 tablet 1    Omega-3 Fatty Acids (FISH OIL) 1000 MG CAPS Take 3,000 mg by mouth 3 times daily       No current facility-administered medications for this visit. Allergies:    Patient has no known allergies.     Social History:   Social History     Socioeconomic History    Marital status:      Spouse name: None    Number of children: None    Years of education: None    Highest education level: None   Occupational History    None   Social Needs    Financial resource strain: None    Food insecurity     Worry: None     Inability: None    Transportation needs     Medical: None     Non-medical: None   Tobacco Use    Smoking status: Current Every Day Smoker     Packs/day: 0.50     Years: 15.00     Pack years: 7.50     Types: Cigarettes    Smokeless tobacco: Never Used   Substance and Sexual Activity    Alcohol use: Yes     Comment: daily    Drug use: No    Sexual activity: None   Lifestyle    Physical activity     Days per week: None     Minutes per session: None    Stress: None   Relationships    Social connections     Talks on phone: None     Gets together: None     Attends Tenriism service: None     Active member of club or organization: None     Attends meetings of clubs or organizations: None     Relationship status: None    Intimate partner violence     Fear of current or ex partner: None     Emotionally abused: None     Physically abused: None     Forced sexual activity: None   Other Topics Concern    None   Social History Narrative    None     Family History:  Family History   Problem Relation Age of Onset    Alzheimer's Disease Mother     No Known Problems Father      I have reviewed the CC, HPI, ROS, PMH, FHX, Social History, and if not present in this note, I have reviewed in the patient's chart. I agree with the documentation provided by other staff and have reviewed their documentation prior to providing my signature indicating agreement. Vitals:   /79   Pulse 78   Ht 6' 4\" (1.93 m)   Wt 200 lb (90.7 kg)   BMI 24.34 kg/m²  Body mass index is 24.34 kg/m². Physical Examination:     Orthopedics:    GENERAL: Alert and oriented X3 in no acute distress. SKIN: Intact without lesions or ulcerations. NEURO: Musculoskeletal and axillary nerves intact to sensory and motor testing. VASC: Capillary refill is less than 3 seconds. Left Shoulder Exam    GEN: Alert and oriented X 3, in no acute distress. SKIN: Intact without rashes, lesions, or ulcerations. NEURO: Musculoskeletal ans axillary nerves intact to sensory and motor testing. VASC: Cap refill less than than 3 secs. Negative Adson's test, Negative Silvia's test.  ROM: actively 30/passively 140 degrees of forward elevation, 30 degrees of external rotation in neutral, 80 degrees of external rotation in abduction, internal rotation to back pocket. STRENGTH: Supraspinatus 3/5, external rotators 4/5. MUSC: No atrophy, negative subscap lift off or belly press test.  IMP: (+) Neer's sign, (+) Hawkin's sign, (+) Coracoid impingement, no painful arc, no pain with cross body abduction. PALP: no pain over anterolateral acromion, no pain over AC joint, no pain over traps/rhomboids. INST: (-) Langley's test, (-) Speed's test, Lag sign. No external rotation lag, (-) hornblower's sign. Assessment:     1. exam:     Answer:   rotator cuff tear     I, Edward Day V, am scribing for and in the presence of Brigitte Panchal D.O. 9/7/2020  5:31 PM      I, Brigitte Panchal DO, have personally seen this patient and I have reviewed the CC, PMH, FHX and Social History as provided by other clinical staff. I reassessed the HPI and ROS as scribed by Zander Leyva Scribosmin in my presence and it is both accurate and complete. Thereafter, I personally performed the PE, reviewed the imaging and established the DX and POC. I agree with the documentation provided by the Medical Scribe. I have reviewed all documentation in its entirety prior to providing my signature indicating agreement. Any areas of disagreement are noted on the chart.     Electronically signed by Vicente Lemos DO on 9/7/2020 at 5:32 PM        Electronically signed by Vicente Lemos DO, on 9/7/2020 at 5:31 PM

## 2020-09-03 NOTE — LETTER
[address] [place] [address] and Sports Medicine  [address] [address]  Phone: [phone]  Fax: DO Kate        September 3, 2020     Patient: Claudetta Baxter   YOB: 1965   Date of Visit: 9/3/2020       To Whom It May Concern: It is my medical opinion that Claudetta Baxter remain off work starting 8/31/2020 through 12/31/2020. If you have any questions or concerns, please don't hesitate to call.     Sincerely,        Adrian Mckeon DO

## 2020-09-03 NOTE — PROGRESS NOTES
[facility] ORTHOPEDICS AND SPORTS MEDICINE  [address] [facility] [address]  Dept: [phone]  Dept Fax: [phone]        {RIGHT OR LEFT:81598} Shoulder - {Blank multiple:90193::\"New Patient\",\"Follow Up\"}     Chief Complaint:     Chief Complaint   Patient presents with    Shoulder Injury     left shoulder pain, HX of Sx w Lindalou Goldberg @ [facility] in 2011, [place] on 8/29/20     HPI:     Rajeev Cook is a 53y.o. year old {RIGHT/LEFT:16} hand dominant male that has had pain in the {Left/right:96517} shoulder for {#:40961} {DAYS:21186}. As far as any trauma to the shoulder, the patient indicates***. The pain {IS:19891} worse at night and when doing overhead activities. Weakness of the shoulder {HAS HAS NOT:65065} been noted. The pain restricts activities such as ***. The pain does not seem to improve with time. The following medications have been tried ***. The patient {HAS HAS FLE:65751} had a corticosteroid injection. The patient {HAS HAS MYW:66099} tried physical therapy. The patient {HAS HAS QSD:10314} has surgery. The opposite shoulder {IS/IS UNM:36861} okay. Neck pain {HAS HAS NOT:04225} been present. Review of Systems    Past Medical History:    Past Medical History:   Diagnosis Date    Hyperlipidemia     Hypertension        Past Surgical History:    Past Surgical History:   Procedure Laterality Date    ROTATOR CUFF REPAIR  2011       CurrentMedications:   Current Outpatient Medications   Medication Sig Dispense Refill    lisinopril (PRINIVIL;ZESTRIL) 20 MG tablet TAKE 1 TABLET BY MOUTH ONE TIME A DAY 90 tablet 1    atorvastatin (LIPITOR) 20 MG tablet TAKE 1 TABLET BY MOUTH ONE TIME A DAY 90 tablet 1    Omega-3 Fatty Acids (FISH OIL) 1000 MG CAPS Take 3,000 mg by mouth 3 times daily       No current facility-administered medications for this visit. Allergies:    Patient has no known allergies.     Social History:   Social History     Socioeconomic will call the office immediately with any problems. No orders of the defined types were placed in this encounter. Orders Placed This Encounter   Procedures    XR SHOULDER LEFT (MIN 2 VIEWS)     Standing Status:   Future     Number of Occurrences:   1     Standing Expiration Date:   9/3/2021     Order Specific Question:   Reason for exam:     Answer:   pain       I, {Blank single:19197::\"Edward Day V\",\"Bel Naik, MS, AT, ATC\",\"Vance Freeman, MS, AT, ATC\"}, am scribing for and in the presence of {Blank single:19197::\"Kamran CARLOS\",\"Bel Razo PA-C\"}.  9/3/2020  10:25 AM    ***    Electronically signed by Carlee Pérez PA-C, on 9/3/2020 at 10:25 AM

## 2020-09-04 ENCOUNTER — TELEPHONE (OUTPATIENT)
Dept: ORTHOPEDIC SURGERY | Age: 55
End: 2020-09-04

## 2020-09-04 NOTE — TELEPHONE ENCOUNTER
Spoke with Priscila Smith and his wife. Reviewed the impression of the MRI. He feels comfortable with by passing Thursdays scheduled appt for review of the MRI and go ahead and get a surgery scheduled sooner than later. They feel comfortable with coming to discuss pre op plan in the near future. I have sent this msg to both Dr Shanae Mckay and Eduarda Sanchez for review and to expedite patients wishes.

## 2020-09-09 ENCOUNTER — HOSPITAL ENCOUNTER (OUTPATIENT)
Dept: PREADMISSION TESTING | Age: 55
Discharge: HOME | End: 2020-09-13
Payer: COMMERCIAL

## 2020-09-09 VITALS
SYSTOLIC BLOOD PRESSURE: 134 MMHG | HEIGHT: 76 IN | TEMPERATURE: 97.2 F | HEART RATE: 82 BPM | DIASTOLIC BLOOD PRESSURE: 85 MMHG | WEIGHT: 194 LBS | RESPIRATION RATE: 16 BRPM | OXYGEN SATURATION: 99 % | BODY MASS INDEX: 23.62 KG/M2

## 2020-09-09 LAB
ABSOLUTE EOS #: 0.13 K/UL (ref 0–0.44)
ABSOLUTE IMMATURE GRANULOCYTE: 0.09 K/UL (ref 0–0.3)
ABSOLUTE LYMPH #: 2.49 K/UL (ref 1.1–3.7)
ABSOLUTE MONO #: 0.68 K/UL (ref 0.1–1.2)
ANION GAP SERPL CALCULATED.3IONS-SCNC: 10 MMOL/L (ref 9–17)
BASOPHILS # BLD: 1 % (ref 0–2)
BASOPHILS ABSOLUTE: 0.07 K/UL (ref 0–0.2)
BUN BLDV-MCNC: 12 MG/DL (ref 6–20)
CHLORIDE BLD-SCNC: 100 MMOL/L (ref 98–107)
CO2: 24 MMOL/L (ref 20–31)
CREAT SERPL-MCNC: 0.82 MG/DL (ref 0.7–1.2)
DIFFERENTIAL TYPE: ABNORMAL
EOSINOPHILS RELATIVE PERCENT: 1 % (ref 1–4)
GFR AFRICAN AMERICAN: >60 ML/MIN
GFR NON-AFRICAN AMERICAN: >60 ML/MIN
GFR SERPL CREATININE-BSD FRML MDRD: NORMAL ML/MIN/{1.73_M2}
GFR SERPL CREATININE-BSD FRML MDRD: NORMAL ML/MIN/{1.73_M2}
HCT VFR BLD CALC: 41.3 % (ref 40.7–50.3)
HEMOGLOBIN: 14.2 G/DL (ref 13–17)
IMMATURE GRANULOCYTES: 1 %
LYMPHOCYTES # BLD: 21 % (ref 24–43)
MCH RBC QN AUTO: 32.9 PG (ref 25.2–33.5)
MCHC RBC AUTO-ENTMCNC: 34.4 G/DL (ref 28.4–34.8)
MCV RBC AUTO: 95.8 FL (ref 82.6–102.9)
MONOCYTES # BLD: 6 % (ref 3–12)
NRBC AUTOMATED: 0 PER 100 WBC
PDW BLD-RTO: 14 % (ref 11.8–14.4)
PLATELET # BLD: 269 K/UL (ref 138–453)
PLATELET ESTIMATE: ABNORMAL
PMV BLD AUTO: 9.1 FL (ref 8.1–13.5)
POTASSIUM SERPL-SCNC: 3.8 MMOL/L (ref 3.7–5.3)
RBC # BLD: 4.31 M/UL (ref 4.21–5.77)
RBC # BLD: ABNORMAL 10*6/UL
SEG NEUTROPHILS: 70 % (ref 36–65)
SEGMENTED NEUTROPHILS ABSOLUTE COUNT: 8.28 K/UL (ref 1.5–8.1)
SODIUM BLD-SCNC: 134 MMOL/L (ref 135–144)
WBC # BLD: 11.7 K/UL (ref 3.5–11.3)
WBC # BLD: ABNORMAL 10*3/UL

## 2020-09-09 PROCEDURE — 85025 COMPLETE CBC W/AUTO DIFF WBC: CPT

## 2020-09-09 PROCEDURE — 82565 ASSAY OF CREATININE: CPT

## 2020-09-09 PROCEDURE — 84520 ASSAY OF UREA NITROGEN: CPT

## 2020-09-09 PROCEDURE — 36415 COLL VENOUS BLD VENIPUNCTURE: CPT

## 2020-09-09 PROCEDURE — 80051 ELECTROLYTE PANEL: CPT

## 2020-09-09 PROCEDURE — 93005 ELECTROCARDIOGRAM TRACING: CPT | Performed by: ANESTHESIOLOGY

## 2020-09-09 RX ORDER — IBUPROFEN 200 MG
200 TABLET ORAL EVERY 6 HOURS PRN
COMMUNITY
End: 2020-10-20

## 2020-09-09 RX ORDER — CEFAZOLIN SODIUM 2 G/50ML
2 SOLUTION INTRAVENOUS ONCE
Status: CANCELLED | OUTPATIENT
Start: 2020-09-15

## 2020-09-09 ASSESSMENT — PAIN DESCRIPTION - FREQUENCY: FREQUENCY: CONTINUOUS

## 2020-09-09 ASSESSMENT — PAIN DESCRIPTION - LOCATION: LOCATION: SHOULDER

## 2020-09-09 ASSESSMENT — PAIN SCALES - GENERAL: PAINLEVEL_OUTOF10: 4

## 2020-09-09 ASSESSMENT — PAIN DESCRIPTION - PAIN TYPE: TYPE: CHRONIC PAIN

## 2020-09-09 ASSESSMENT — PAIN DESCRIPTION - DESCRIPTORS: DESCRIPTORS: ACHING;SORE

## 2020-09-09 ASSESSMENT — PAIN - FUNCTIONAL ASSESSMENT: PAIN_FUNCTIONAL_ASSESSMENT: PREVENTS OR INTERFERES WITH ALL ACTIVE AND SOME PASSIVE ACTIVITIES

## 2020-09-09 ASSESSMENT — PAIN DESCRIPTION - ONSET: ONSET: ON-GOING

## 2020-09-09 ASSESSMENT — PAIN DESCRIPTION - PROGRESSION: CLINICAL_PROGRESSION: NOT CHANGED

## 2020-09-09 ASSESSMENT — PAIN DESCRIPTION - ORIENTATION: ORIENTATION: LEFT

## 2020-09-09 NOTE — PROGRESS NOTES
PAT Progress Note    Pt Name: Armen Duarte  MRN: [id number]  YOB: 1965  Date of evaluation: 9/9/2020      [x] Called to PAT. I spoke to Armen Duarte a 53 y.o. male who is scheduled for a left shoulder arthroscopy with rotator cuff repair by Dr. Charisse Street on 09/15/2020 at 1200 due to left rotator cuff tear. I reviewed the orthopedic surgery progress note found in [facility] dated 09/03/2020 by Dr. Charisse Street. This meets the criteria for an interval History and Physical and will be updated with a note on the day of the patient's surgery. History of a TIA in the early 1990s, hypertension, and hyperlipidemia. Pt denies history of a stroke, respiratory disease, and diabetes. Alert and oriented without apparent distress. Pt denies chest pain, dyspnea, dizziness, and palpitations. WBC count today is 11.7 k/uL which is down from 12.9 k/uL on 07/16/2019. Pt denies history of leukocytosis and denies following-up with a hematologist.  Mak Guardado pt to follow-up with his PCP regarding the elevated WBC count. Pt voiced understanding. Functional Capacity per pt:   1) Pt is able to walk 2 city blocks on level ground without SOB. 2) Pt is able to climb 2 flights of stairs without SOB. 3) Pt is not able to walk up a hill for 1-2 city blocks without SOB. Physical Examination:   Heart: Regular rate and rhythm without murmur, gallop, or rub. Lungs: Normal respiratory effort, unlabored, and clear to auscultation without wheezes or rales bilaterally. Abdomen: Soft, non-tender, non-distended with active bowel sounds.      Naveen CONRAD, FNP-BC  Electronically signed 9/9/2020 at 4:51 PM      Saúl Zepeda DO    Physician    Specialty:  Orthopedic Surgery    Progress Notes      Signed    Encounter Date:  9/3/2020          Related encounter: Office Visit from 9/3/2020 in [address] [place] [address] and Sports Medicine          Signed        Expand All Collapse All     Show:Clear all  [x]Manual[x]Template[]Copied    Added by:  [x]Edyung Day[x]Kamran Austin DO    []Marcos for details        [facility] [address]  Dept: [phone]  Dept Fax: [phone]         Left Shoulder - Established Patient - New Complaint      Chief Complaint:           Chief Complaint   Patient presents with    Shoulder Injury       left shoulder pain, HX of Sx w Judy Pina @ [address]"" in 2011, [place] on 8/29/20     HPI:      Jamari Saavedra is a 53y.o. year old right hand dominant male that has had pain in the left shoulder for 5 days. As far as any trauma to the shoulder, the patient indicates that he fell on 08/29/2020 and he re-injured the shoulder and he states the shoulder feels like the rotator cuff is torn again. He also injured the shoulder the same way back in 2011 but it was a fall in the winter instead of the summer. The pain is worse at night and when doing overhead activities. Weakness of the shoulder has been noted. The pain restricts activities such as working his job. The pain does not seem to improve with time. The following medications have been tried: Advil and percocet. The patient has not had a corticosteroid injection. The patient has not tried physical therapy. The patient has had surgery in 2011 and it was a left shoulder arthroscopy with rotator cuff repair and it was done by Dr. Judy Pina. The opposite shoulder is okay. Neck pain has not been present. Review of Systems   Constitutional: Positive for activity change. Negative for appetite change. Respiratory: Negative for apnea, cough, chest tightness and shortness of breath. Cardiovascular: Negative for chest pain, palpitations and leg swelling. Gastrointestinal: Negative for abdominal distention, abdominal pain, constipation, diarrhea, nausea and vomiting. Genitourinary: Negative for difficulty urinating, dysuria and hematuria. Musculoskeletal: Positive for arthralgias. Negative for gait problem, joint swelling and myalgias. Skin: Negative for color change and rash. Neurological: Negative for dizziness, weakness, numbness and headaches. Psychiatric/Behavioral: Negative for sleep disturbance. Past Medical History:    Past Medical History        Past Medical History:   Diagnosis Date    Hyperlipidemia      Hypertension          Past Surgical History:    Past Surgical History         Past Surgical History:   Procedure Laterality Date    ROTATOR CUFF REPAIR   2011        Current Medications:   Current Facility-Administered Medications   Current Outpatient Medications   Medication Sig Dispense Refill    lisinopril (PRINIVIL;ZESTRIL) 20 MG tablet TAKE 1 TABLET BY MOUTH ONE TIME A DAY 90 tablet 1    atorvastatin (LIPITOR) 20 MG tablet TAKE 1 TABLET BY MOUTH ONE TIME A DAY 90 tablet 1    Omega-3 Fatty Acids (FISH OIL) 1000 MG CAPS Take 3,000 mg by mouth 3 times daily          No current facility-administered medications for this visit. Allergies:    Patient has no known allergies.      Social History:   Social History   Social History            Socioeconomic History    Marital status:        Spouse name: None    Number of children: None    Years of education: None    Highest education level: None   Occupational History    None   Social Needs    Financial resource strain: None    Food insecurity       Worry: None       Inability: None    Transportation needs       Medical: None       Non-medical: None   Tobacco Use    Smoking status: Current Every Day Smoker       Packs/day: 0.50       Years: 15.00       Pack years: 7.50       Types: Cigarettes    Smokeless tobacco: Never Used   Substance and Sexual Activity    Alcohol use: Yes       Comment: daily    Drug use: No    Sexual activity: None   Lifestyle    Physical activity       Days per week: None       Minutes per session: None    Stress: None Relationships    Social connections       Talks on phone: None       Gets together: None       Attends Anabaptist service: None       Active member of club or organization: None       Attends meetings of clubs or organizations: None       Relationship status: None    Intimate partner violence       Fear of current or ex partner: None       Emotionally abused: None       Physically abused: None       Forced sexual activity: None   Other Topics Concern    None   Social History Narrative    None        Family History:  Family History         Family History   Problem Relation Age of Onset    Alzheimer's Disease Mother      No Known Problems Father          I have reviewed the CC, HPI, ROS, PMH, FHX, Social History, and if not present in this note, I have reviewed in the patient's chart. I agree with the documentation provided by other staff and have reviewed their documentation prior to providing my signature indicating agreement. Vitals:   /79   Pulse 78   Ht 6' 4\" (1.93 m)   Wt 200 lb (90.7 kg)   BMI 24.34 kg/m²  Body mass index is 24.34 kg/m². Physical Examination:      Orthopedics:     GENERAL: Alert and oriented X3 in no acute distress. SKIN: Intact without lesions or ulcerations. NEURO: Musculoskeletal and axillary nerves intact to sensory and motor testing. VASC: Capillary refill is less than 3 seconds. Left Shoulder Exam     GEN: Alert and oriented X 3, in no acute distress. SKIN: Intact without rashes, lesions, or ulcerations. NEURO: Musculoskeletal ans axillary nerves intact to sensory and motor testing. VASC: Cap refill less than than 3 secs. Negative Adson's test, Negative Silvia's test.  ROM: actively 30/passively 140 degrees of forward elevation, 30 degrees of external rotation in neutral, 80 degrees of external rotation in abduction, internal rotation to back pocket. STRENGTH: Supraspinatus 3/5, external rotators 4/5.     MUSC: No atrophy, negative subscap lift off or belly press test.  IMP: (+) Neer's sign, (+) Hawkin's sign, (+) Coracoid impingement, no painful arc, no pain with cross body abduction. PALP: no pain over anterolateral acromion, no pain over AC joint, no pain over traps/rhomboids. INST: (-) [place]'s test, (-) Speed's test, Lag sign. No external rotation lag, (-) hornblower's sign. Assessment:      1. Left shoulder pain, unspecified chronicity    2. Traumatic complete tear of left rotator cuff, initial encounter      Procedures:    Procedure: no  Radiology:   No results found. Plan:   Treatment : I reviewed the X-ray with the patient. We discussed the etiologies and natural histories of s/p left shoulder arthroscopy. We discussed the various treatment alternatives including anti-inflammatory medications, physical therapy, injections, further imaging studies and as a last result surgery. During today's visit, I explained to the patient that he most likely re-tore his rotator cuff after the fall and I was able to determine that based on my physical examination. Since that is the case, I informed him that I feel we should get a STAT MRI to see how large the tear is within the shoulder. But over the next few days, I informed him that he should work on stretching his shoulder out over the next few weeks so he does not get stiff and then once we get the MRI of the shoulder, I will have him come back to review it. The patient then stated that he understands the plan and at this time, the patient has opted for a STAT MRI of the left shoulder. The patient was also given a note stating that he is to remain off of work until we see him back because I feel he will need a surgery. Patient should return to the clinic after the MRI and he is to follow up with Madhavi Chamberlain D.O. The patient will call the office immediately with any problems. Encounter Medications    No orders of the defined types were placed in this encounter.            Orders Placed This Encounter

## 2020-09-09 NOTE — PRE-PROCEDURE INSTRUCTIONS
Covid testing on 9/11/20 at 1:40pm main entrance, please stay in your vehicle and a hosea will come to you. ARRIVE AT [facility] ON Tuesday, 9/15/20 at 10:00 AM  On arrival, please call [phone]. Continue to take your home medications as you normally do up to and including the night before surgery with the exception of any blood thinning medications. Please stop any blood thinning medications as directed by your surgeon or prescribing physician. Failure to stop certain medications may interfere with your scheduled surgery. These may include:  Aspirin, Warfarin (Coumadin), Clopidogrel (Plavix), Ibuprofen (Motrin, Advil), Naproxen (Aleve), Meloxicam (Mobic), Celecoxib (Celebrex), Diclofenac, Eliquis, Pradaxa, Xarelto, Effient, Fish Oil, Herbal supplements. Tylenol/Acetaminophen is okay. Please take the following medication(s) the day of surgery with a small sip of water:  None. Please use your inhalers at home the day of surgery. PREPARING FOR YOUR SURGERY:     Before surgery, you can play an important role in your own health. Because skin is not sterile, we need to be sure that your skin is as free of germs as possible before surgery by carefully washing before surgery. Preparing or prepping skin before surgery can reduce the risk of a surgical site infection.   Do not shave the area of your body where your surgery will be performed unless you received specific permission from your physician. You will need to shower at home the night before surgery and the morning of surgery with a special soap called chlorhexidine gluconate (CHG*). *Not to be used by people allergic to Chlorhexidine Gluconate (CHG). Following these instructions will help you be sure that your skin is clean before surgery. Instructions on cleaning your skin before surgery: The night before your surgery:      You will need to shower with warm water (not hot) and the CHG soap.        Use a clean wash cloth and a clean towel. Have clean clothes available to put on after the shower.    First wash your hair with regular shampoo. Rinse your hair and body thoroughly to remove the shampoo. Keagan Benitez Wash your face with your regular soap or water only. Thoroughly rinse your body with warm water from the neck down.  Turn water off to prevent rinsing the soap off too soon.  With a clean wet washcloth and half of the CHG soap in the bottle, lather your entire body from the neck down. Do not use CHG soap near your eyes or ears to avoid injury to those areas.  Wash thoroughly, paying special attention to the area where your surgery will be performed.  Wash your body gently for five (5) minutes. Avoid scrubbing your skin too hard.  Turn the water back on and rinse your body thoroughly.  Pat yourself dry with a clean, soft towel. Do not apply lotion, cream or powder.  Dress with clean freshly washed clothes. The morning of surgery:     Repeat shower following steps above - using remaining half of CHG soap in bottle. Patient Instructions:    Keagan Benitez If you are having any type of anesthesia you are to have nothing to eat or drink after midnight the night before your surgery. This includes gum, mints, water or smoking or chewing tobacco.  The only exception to this is a small sip of water to take with any morning dose of heart, blood pressure, or seizure medications. No alcoholic beverages for 24 hours prior to surgery.  Bring a list of all medications you take, along with the dose of the medications and how often you take it. If more convenient bring the pharmacy bottles in a zip lock bag.  Brush your teeth but do not swallow water.  Bring your eyeglasses and case with you. No contacts are to be worn the day of surgery. You also may bring your hearing aids. Most surgical procedures involving anesthesia will require that you remove your dentures prior to surgery.      If you family member must stay at the hospital throughout your procedure. Someone must remain with you for the first 24 hours after your surgery if you receive anesthesia or medication. If you do not have someone to stay with you, your procedure may be cancelled.       If you have any other questions regarding your procedure or the day of surgery, please call [phone]      _________________________  ____________________________  Signature (Patient)              Signature (Provider) & date

## 2020-09-10 ENCOUNTER — OFFICE VISIT (OUTPATIENT)
Dept: ORTHOPEDIC SURGERY | Age: 55
End: 2020-09-10
Payer: COMMERCIAL

## 2020-09-10 VITALS
DIASTOLIC BLOOD PRESSURE: 99 MMHG | HEART RATE: 82 BPM | WEIGHT: 194 LBS | BODY MASS INDEX: 23.62 KG/M2 | SYSTOLIC BLOOD PRESSURE: 148 MMHG | HEIGHT: 76 IN

## 2020-09-10 PROCEDURE — 3017F COLORECTAL CA SCREEN DOC REV: CPT | Performed by: ORTHOPAEDIC SURGERY

## 2020-09-10 PROCEDURE — 99213 OFFICE O/P EST LOW 20 MIN: CPT | Performed by: ORTHOPAEDIC SURGERY

## 2020-09-10 PROCEDURE — 4004F PT TOBACCO SCREEN RCVD TLK: CPT | Performed by: ORTHOPAEDIC SURGERY

## 2020-09-10 PROCEDURE — G8420 CALC BMI NORM PARAMETERS: HCPCS | Performed by: ORTHOPAEDIC SURGERY

## 2020-09-10 PROCEDURE — G8427 DOCREV CUR MEDS BY ELIG CLIN: HCPCS | Performed by: ORTHOPAEDIC SURGERY

## 2020-09-10 ASSESSMENT — ENCOUNTER SYMPTOMS
COLOR CHANGE: 0
COUGH: 0
CONSTIPATION: 0
CHEST TIGHTNESS: 0
NAUSEA: 0
APNEA: 0
SHORTNESS OF BREATH: 0
ABDOMINAL PAIN: 0
RESPIRATORY NEGATIVE: 1
ABDOMINAL DISTENTION: 0
DIARRHEA: 0
VOMITING: 0

## 2020-09-10 NOTE — PROGRESS NOTES
[address] [facility]  [address] [place] [address]  Dept: [phone]  Dept Fax: [phone]        Left Shoulder - Follow Up     Chief Complaint:     Chief Complaint   Patient presents with    Pre-op Exam     left shoulder scope DOS- 9/15/20, MRI review     HPI:     Fdeerico Chan is a 53y.o. year old right hand dominant male that has had pain in the left shoulder for 5 days. As far as any trauma to the shoulder, the patient indicates that he fell on 08/29/2020 and he re-injured the shoulder and he states the shoulder feels like the rotator cuff is torn again. He also injured the shoulder the same way back in 2011 but it was a fall in the winter instead of the summer. The pain is worse at night and when doing overhead activities. Weakness of the shoulder has been noted. The pain restricts activities such as working his job. The pain does not seem to improve with time. The following medications have been tried: Advil and percocet. The patient has not had a corticosteroid injection. The patient has not tried physical therapy. The patient has had surgery in 2011 and it was a left shoulder arthroscopy with rotator cuff repair and it was done by Dr. Emeka Costello. The opposite shoulder is okay. Neck pain has not been present. He is here for a MRI review. Review of Systems   Constitutional: Positive for activity change. Negative for appetite change, fatigue and fever. Respiratory: Negative. Negative for apnea, cough, chest tightness and shortness of breath. Cardiovascular: Negative. Negative for chest pain, palpitations and leg swelling. Gastrointestinal: Negative for abdominal distention, abdominal pain, constipation, diarrhea, nausea and vomiting. Genitourinary: Negative for difficulty urinating, dysuria and hematuria. Musculoskeletal: Positive for arthralgias. Negative for gait problem, joint swelling and myalgias.    Skin: Negative for color change and rash. Neurological: Positive for weakness. Negative for dizziness, numbness and headaches. Psychiatric/Behavioral: Positive for sleep disturbance. Past Medical History:    Past Medical History:   Diagnosis Date    Hyperlipidemia     Hypertension     TIA (transient ischemic attack)     Early 1990's    Wears dentures     Upper only at present time.  Wears glasses        Past Surgical History:    Past Surgical History:   Procedure Laterality Date    EYE SURGERY      Removal of foreign object in Right eye in 1980's per pt.  ROTATOR CUFF REPAIR Left 2011       CurrentMedications:   Current Outpatient Medications   Medication Sig Dispense Refill    ibuprofen (ADVIL;MOTRIN) 200 MG tablet Take 200 mg by mouth every 6 hours as needed for Pain      lisinopril (PRINIVIL;ZESTRIL) 20 MG tablet TAKE 1 TABLET BY MOUTH ONE TIME A DAY 90 tablet 1    atorvastatin (LIPITOR) 20 MG tablet TAKE 1 TABLET BY MOUTH ONE TIME A DAY (Patient taking differently: daily TAKE 1 TABLET BY MOUTH ONE TIME A DAY) 90 tablet 1    Omega-3 Fatty Acids (FISH OIL) 1000 MG CAPS Take 3,000 mg by mouth 3 times daily       No current facility-administered medications for this visit. Allergies:    Patient has no known allergies. Social History:   Social History     Socioeconomic History    Marital status:      Spouse name: None    Number of children: None    Years of education: None    Highest education level: None   Occupational History    None   Social Needs    Financial resource strain: None    Food insecurity     Worry: None     Inability: None    Transportation needs     Medical: None     Non-medical: None   Tobacco Use    Smoking status: Current Every Day Smoker     Packs/day: 0.50     Years: 15.00     Pack years: 7.50     Types: Cigarettes    Smokeless tobacco: Never Used   Substance and Sexual Activity    Alcohol use:  Yes     Alcohol/week: 12.0 standard drinks     Types: 12 Cans of beer per week    Drug use: No    Sexual activity: None   Lifestyle    Physical activity     Days per week: None     Minutes per session: None    Stress: None   Relationships    Social connections     Talks on phone: None     Gets together: None     Attends Yarsanism service: None     Active member of club or organization: None     Attends meetings of clubs or organizations: None     Relationship status: None    Intimate partner violence     Fear of current or ex partner: None     Emotionally abused: None     Physically abused: None     Forced sexual activity: None   Other Topics Concern    None   Social History Narrative    None       Family History:  Family History   Problem Relation Age of Onset    Alzheimer's Disease Mother     Hypertension Maternal Grandfather     Cancer Neg Hx        I have reviewed the CC, HPI, ROS, PMH, FHX, Social History, and if not present in this note, I have reviewed in the patient's chart. I agree with the documentation provided by other staff and have reviewed their documentation prior to providing my signature indicating agreement. Vitals:   BP (!) 148/99   Pulse 82   Ht 6' 4\" (1.93 m)   Wt 194 lb (88 kg)   BMI 23.61 kg/m²  Body mass index is 23.61 kg/m². Physical Examination:     Orthopedics:    GENERAL: Alert and oriented X3 in no acute distress. SKIN: Intact without lesions or ulcerations. NEURO: Musculoskeletal and axillary nerves intact to sensory and motor testing. VASC: Capillary refill is less than 3 seconds. Left Shoulder Exam     GEN: Alert and oriented X 3, in no acute distress. SKIN: Intact without rashes, lesions, or ulcerations. NEURO: Musculoskeletal ans axillary nerves intact to sensory and motor testing. VASC: Cap refill less than than 3 secs.  Negative Adson's test, Negative Silvia's test.  ROM: actively 100/passively 140 degrees of forward elevation, 30 degrees of external rotation in neutral, 80 degrees of external rotation in abduction, internal rotation to L1  STRENGTH: Supraspinatus 3/5, external rotators 4/5. MUSC: No atrophy, negative subscap lift off or belly press test.  IMP: (+) Neer's sign, (+) Hawkin's sign, (+) Coracoid impingement, no painful arc, no pain with cross body abduction. PALP: no pain over anterolateral acromion, no pain over AC joint, no pain over traps/rhomboids. INST: (-) Upland's test, (-) Speed's test, Lag sign. No external rotation lag, (-) hornblower's sign. Assessment:     1. Traumatic complete tear of left rotator cuff, subsequent encounter      Procedures:    Procedure: no  Radiology:   Mri Shoulder Left Wo Contrast    Result Date: 9/3/2020  EXAMINATION: MRI OF THE LEFT SHOULDER WITHOUT CONTRAST   9/3/2020 4:56 pm TECHNIQUE: Multiplanar multisequence MRI of the left shoulder was performed without the administration of intravenous contrast. COMPARISON: MR arthrogram left shoulder January 25, 2011 HISTORY: ORDERING SYSTEM PROVIDED HISTORY: Left shoulder pain, unspecified chronicity TECHNOLOGIST PROVIDED HISTORY: rotator cuff tear Reason for Exam: Left shoulder pain, unspecified chronicity Acuity: Acute Type of Exam: Initial Additional signs and symptoms: pain, tightness, previous surgery roator cuff Relevant Medical/Surgical History: pt fell 8/27/20, landing on elbow jamming shoulder FINDINGS: ROTATOR CUFF: Postoperative changes of prior rotator cuff repair. Full-thickness re-tear of the supraspinatus tendon with associated severe narrowing of the acromial humeral interval.  Torn fibers are retracted to the glenohumeral joint (image 14 series 9). Interstitial tearing of the infraspinatus with fluid signal extending along the myotendinous junction. There is also a diffusely attenuated appearance of the infraspinatus tendon with intact fibers present. Partial thickness tearing of the subscapularis tendon superimposed on moderate to severe tendinosis. Teres minor is intact.  Moderate atrophy of the supraspinatus and subscapularis muscles and mild-to-moderate atrophy of the infraspinatus. Intramuscular edema predominant involving the teres minor and to a lesser extent the infraspinatus muscle. Mild edema also present within the deltoid muscle. BICEPS TENDON: Long head biceps tendon remains intact. Tendinosis of the intracapsular portion of the tendon. LABRUM: To the extent that the labrum can be visualized on the current exam, there is underlying labral degeneration predominant along the superior labrum. No paralabral cyst identified. GLENOHUMERAL JOINT: Anatomic alignment of the glenohumeral joint with mild degenerative change. No significant effusion. AC JOINT AND ACROMIOCLAVICULAR ARCH: Moderate acromioclavicular osteoarthritis. No os acromiale identified. Small amount of fluid within the subacromial/subdeltoid bursa. BONE MARROW: Large subchondral cystic change along the lesser tuberosity of the humerus. No suspicious marrow occupying lesions. Degenerate marrow signal changes of the acromioclavicular joint. No fracture identified. OUTLET SPACES: The suprascapular notch and quadrilateral space are without obstructing or space occupying lesions. 1. Status post rotator cuff repair with full-thickness re-tear of the supraspinatus tendon. 2. Diffuse and pronounced interstitial tearing of the infraspinatus tendon. 3. Partial thickness tearing of the subscapularis tendon superimposed on moderate to severe tendinosis. 4. Mild glenohumeral and moderate acromioclavicular osteoarthritis. 5. Large subchondral cystic change along the lesser tuberosity of the humerus. No acute osseous abnormality identified. 6. Intramuscular edema of the teres minor and to a lesser extent the infraspinatus and deltoid muscle which may reflect mild muscular strains. Plan:   Surgical Plan: We discussed the natural etiologies and histories of re-tear of his rotator cuff.  We also discussed the various alternatives of medical treatment including physical therapy, injections, anti-inflammatory drugs and as a last resort surgery. During today's face to face encounter, we discussed the details of undergoing a left shoulder arthroscopy surgery. I explained to the patient that He should not eat anything after midnight the night before surgery. He should wash his body with the Hibiclens soap that He was given to reduce the risks of infection. I also instructed the patient not to shave at all this week because we do not want any nicks to develop on the skin for it will increase the risk of infection. The patient will need the following durable medical equipment: a front wheeled walker, toilet commode, and a shower chair to aid in their post operative course. In addition, I explained to the patient that like any other surgery, there are risks, such as: infection, pain, nerve and artery damage, bleeding, lack of muscle stimulus, stiffness, and lastly, a need for further surgery. I also explained to the patient that He will be offered a nerve block and I highly recommend it to patients because it helps reduce the pain during surgery and after surgery. However, it is up to the patient to make that choice for the nerve block and the patient was made aware of that. At this time, the patient has opted for and has decided to undergo a left shoulder arthroscopy surgery. A consent form was signed by the patient today. All the details of the procedure were discussed with the patient and all questions and concerns were answered. The patient was also instructed to make sure that they stop any NSAID's the week before surgery to reduce the risk of complications and to prevent the impairment of the natural healing process of the body. Lastly, He is up and walking and He was also instructed to get up and get moving every hour He is awake to prevent the occurrence of a blood clot.  Patient should return to the office 1 week after surgery with PCXR for a post operative f/u appointment with Car Noriega PA-C. No orders of the defined types were placed in this encounter. No orders of the defined types were placed in this encounter. Zuri Garcia PA-C, am scribing for and in the presence of Romina Jimenez D.O.. 9/13/2020  3:13 PM      I, Romina Jimenez DO, have personally seen this patient and I have reviewed the CC, PMH, FHX and Social History as provided by other clinical staff. I reassessed the HPI and ROS as scribed by Car Noriega PA-C in my presence and it is both accurate and complete. Thereafter, I personally performed the PE, reviewed the imaging and established the DX and POC. I agree with the documentation provided by the Physician Assistant. I have reviewed all documentation in its entirety prior to providing my signature indicating agreement. Any areas of disagreement are noted on the chart.     Electronically signed by Renaldo Reyes DO on 9/13/2020 at 3:13 PM        Electronically signed by Renaldo Reyes DO, on 9/13/2020 at 3:13 PM

## 2020-09-11 ENCOUNTER — HOSPITAL ENCOUNTER (OUTPATIENT)
Dept: PREADMISSION TESTING | Age: 55
Setting detail: SPECIMEN
Discharge: HOME | End: 2020-09-15
Payer: COMMERCIAL

## 2020-09-11 LAB
EKG ATRIAL RATE: 71 BPM
EKG P AXIS: 0 DEGREES
EKG P-R INTERVAL: 162 MS
EKG Q-T INTERVAL: 402 MS
EKG QRS DURATION: 86 MS
EKG QTC CALCULATION (BAZETT): 436 MS
EKG R AXIS: -24 DEGREES
EKG T AXIS: 43 DEGREES
EKG VENTRICULAR RATE: 71 BPM

## 2020-09-11 PROCEDURE — U0003 INFECTIOUS AGENT DETECTION BY NUCLEIC ACID (DNA OR RNA); SEVERE ACUTE RESPIRATORY SYNDROME CORONAVIRUS 2 (SARS-COV-2) (CORONAVIRUS DISEASE [COVID-19]), AMPLIFIED PROBE TECHNIQUE, MAKING USE OF HIGH THROUGHPUT TECHNOLOGIES AS DESCRIBED BY CMS-2020-01-R: HCPCS

## 2020-09-13 LAB — SARS-COV-2, NAA: NOT DETECTED

## 2020-09-15 ENCOUNTER — ANESTHESIA EVENT (OUTPATIENT)
Dept: OPERATING ROOM | Age: 55
End: 2020-09-15
Payer: COMMERCIAL

## 2020-09-15 ENCOUNTER — ANESTHESIA (OUTPATIENT)
Dept: OPERATING ROOM | Age: 55
End: 2020-09-15
Payer: COMMERCIAL

## 2020-09-15 ENCOUNTER — HOSPITAL ENCOUNTER (OUTPATIENT)
Age: 55
Setting detail: OUTPATIENT SURGERY
Discharge: HOME HEALTH | End: 2020-09-15
Attending: ORTHOPAEDIC SURGERY | Admitting: ORTHOPAEDIC SURGERY
Payer: COMMERCIAL

## 2020-09-15 VITALS
BODY MASS INDEX: 23.62 KG/M2 | OXYGEN SATURATION: 97 % | SYSTOLIC BLOOD PRESSURE: 138 MMHG | TEMPERATURE: 97.2 F | HEIGHT: 76 IN | HEART RATE: 95 BPM | RESPIRATION RATE: 19 BRPM | WEIGHT: 194 LBS | DIASTOLIC BLOOD PRESSURE: 76 MMHG

## 2020-09-15 VITALS — DIASTOLIC BLOOD PRESSURE: 66 MMHG | TEMPERATURE: 96.6 F | SYSTOLIC BLOOD PRESSURE: 119 MMHG | OXYGEN SATURATION: 99 %

## 2020-09-15 LAB — GLUCOSE BLD-MCNC: 147 MG/DL (ref 75–110)

## 2020-09-15 PROCEDURE — 6360000002 HC RX W HCPCS: Performed by: NURSE ANESTHETIST, CERTIFIED REGISTERED

## 2020-09-15 PROCEDURE — 7100000010 HC PHASE II RECOVERY - FIRST 15 MIN: Performed by: ORTHOPAEDIC SURGERY

## 2020-09-15 PROCEDURE — 7100000001 HC PACU RECOVERY - ADDTL 15 MIN: Performed by: ORTHOPAEDIC SURGERY

## 2020-09-15 PROCEDURE — 7100000011 HC PHASE II RECOVERY - ADDTL 15 MIN: Performed by: ORTHOPAEDIC SURGERY

## 2020-09-15 PROCEDURE — L3650 SO 8 ABD RESTRAINT PRE OTS: HCPCS | Performed by: ORTHOPAEDIC SURGERY

## 2020-09-15 PROCEDURE — 64415 NJX AA&/STRD BRCH PLXS IMG: CPT | Performed by: ANESTHESIOLOGY

## 2020-09-15 PROCEDURE — 6360000002 HC RX W HCPCS: Performed by: ORTHOPAEDIC SURGERY

## 2020-09-15 PROCEDURE — 2500000003 HC RX 250 WO HCPCS: Performed by: NURSE ANESTHETIST, CERTIFIED REGISTERED

## 2020-09-15 PROCEDURE — 2580000003 HC RX 258: Performed by: ORTHOPAEDIC SURGERY

## 2020-09-15 PROCEDURE — 6360000002 HC RX W HCPCS: Performed by: ANESTHESIOLOGY

## 2020-09-15 PROCEDURE — 3700000001 HC ADD 15 MINUTES (ANESTHESIA): Performed by: ORTHOPAEDIC SURGERY

## 2020-09-15 PROCEDURE — C1713 ANCHOR/SCREW BN/BN,TIS/BN: HCPCS | Performed by: ORTHOPAEDIC SURGERY

## 2020-09-15 PROCEDURE — 2709999900 HC NON-CHARGEABLE SUPPLY: Performed by: ORTHOPAEDIC SURGERY

## 2020-09-15 PROCEDURE — 29823 SHO ARTHRS SRG XTNSV DBRDMT: CPT | Performed by: ORTHOPAEDIC SURGERY

## 2020-09-15 PROCEDURE — 2580000003 HC RX 258: Performed by: ANESTHESIOLOGY

## 2020-09-15 PROCEDURE — 6370000000 HC RX 637 (ALT 250 FOR IP): Performed by: ANESTHESIOLOGY

## 2020-09-15 PROCEDURE — 29806 SHO ARTHRS SRG CAPSULORRAPHY: CPT | Performed by: ORTHOPAEDIC SURGERY

## 2020-09-15 PROCEDURE — 3700000000 HC ANESTHESIA ATTENDED CARE: Performed by: ORTHOPAEDIC SURGERY

## 2020-09-15 PROCEDURE — 3600000013 HC SURGERY LEVEL 3 ADDTL 15MIN: Performed by: ORTHOPAEDIC SURGERY

## 2020-09-15 PROCEDURE — 7100000000 HC PACU RECOVERY - FIRST 15 MIN: Performed by: ORTHOPAEDIC SURGERY

## 2020-09-15 PROCEDURE — 2500000003 HC RX 250 WO HCPCS: Performed by: ANESTHESIOLOGY

## 2020-09-15 PROCEDURE — 82947 ASSAY GLUCOSE BLOOD QUANT: CPT

## 2020-09-15 PROCEDURE — 3600000003 HC SURGERY LEVEL 3 BASE: Performed by: ORTHOPAEDIC SURGERY

## 2020-09-15 PROCEDURE — C9290 INJ, BUPIVACAINE LIPOSOME: HCPCS | Performed by: ANESTHESIOLOGY

## 2020-09-15 PROCEDURE — 29827 SHO ARTHRS SRG RT8TR CUF RPR: CPT | Performed by: ORTHOPAEDIC SURGERY

## 2020-09-15 PROCEDURE — 2720000010 HC SURG SUPPLY STERILE: Performed by: ORTHOPAEDIC SURGERY

## 2020-09-15 DEVICE — ANCHOR SUT SFT FIBERTAK: Type: IMPLANTABLE DEVICE | Site: SHOULDER | Status: FUNCTIONAL

## 2020-09-15 DEVICE — ANCHOR SUTURE BIOCOMP 4.75X19.1 MM SWIVELOCK C: Type: IMPLANTABLE DEVICE | Site: SHOULDER | Status: FUNCTIONAL

## 2020-09-15 DEVICE — GRAFT HUM TISS W40 X L70MM THK2.5-3.5MM ACELLULAR DERM MTRX: Type: IMPLANTABLE DEVICE | Site: SHOULDER | Status: FUNCTIONAL

## 2020-09-15 DEVICE — SYSTEM IMPL INCL 2 4.75MM BIOCOMPOSITE SWIVELOCK C ANCHR: Type: IMPLANTABLE DEVICE | Site: SHOULDER | Status: FUNCTIONAL

## 2020-09-15 RX ORDER — OXYCODONE HYDROCHLORIDE AND ACETAMINOPHEN 5; 325 MG/1; MG/1
1 TABLET ORAL ONCE
Status: COMPLETED | OUTPATIENT
Start: 2020-09-15 | End: 2020-09-15

## 2020-09-15 RX ORDER — FENTANYL CITRATE 50 UG/ML
25 INJECTION, SOLUTION INTRAMUSCULAR; INTRAVENOUS EVERY 5 MIN PRN
Status: COMPLETED | OUTPATIENT
Start: 2020-09-15 | End: 2020-09-15

## 2020-09-15 RX ORDER — EPINEPHRINE 1 MG/ML
INJECTION INTRAMUSCULAR; INTRAVENOUS; SUBCUTANEOUS
Status: DISCONTINUED
Start: 2020-09-15 | End: 2020-09-15 | Stop reason: HOSPADM

## 2020-09-15 RX ORDER — ONDANSETRON 2 MG/ML
INJECTION INTRAMUSCULAR; INTRAVENOUS PRN
Status: DISCONTINUED | OUTPATIENT
Start: 2020-09-15 | End: 2020-09-15 | Stop reason: SDUPTHER

## 2020-09-15 RX ORDER — VASOPRESSIN 20 U/ML
INJECTION PARENTERAL
Status: COMPLETED
Start: 2020-09-15 | End: 2020-09-15

## 2020-09-15 RX ORDER — PHENYLEPHRINE HCL IN 0.9% NACL 1 MG/10 ML
SYRINGE (ML) INTRAVENOUS PRN
Status: DISCONTINUED | OUTPATIENT
Start: 2020-09-15 | End: 2020-09-15 | Stop reason: SDUPTHER

## 2020-09-15 RX ORDER — DEXAMETHASONE SODIUM PHOSPHATE 10 MG/ML
INJECTION, SOLUTION INTRAMUSCULAR; INTRAVENOUS PRN
Status: DISCONTINUED | OUTPATIENT
Start: 2020-09-15 | End: 2020-09-15 | Stop reason: SDUPTHER

## 2020-09-15 RX ORDER — ROCURONIUM BROMIDE 10 MG/ML
INJECTION, SOLUTION INTRAVENOUS PRN
Status: DISCONTINUED | OUTPATIENT
Start: 2020-09-15 | End: 2020-09-15 | Stop reason: SDUPTHER

## 2020-09-15 RX ORDER — HYDROMORPHONE HCL 110MG/55ML
0.25 PATIENT CONTROLLED ANALGESIA SYRINGE INTRAVENOUS EVERY 5 MIN PRN
Status: DISCONTINUED | OUTPATIENT
Start: 2020-09-15 | End: 2020-09-15 | Stop reason: HOSPADM

## 2020-09-15 RX ORDER — LIDOCAINE HYDROCHLORIDE 10 MG/ML
1 INJECTION, SOLUTION EPIDURAL; INFILTRATION; INTRACAUDAL; PERINEURAL
Status: DISCONTINUED | OUTPATIENT
Start: 2020-09-16 | End: 2020-09-15 | Stop reason: HOSPADM

## 2020-09-15 RX ORDER — EPHEDRINE SULFATE/0.9% NACL/PF 50 MG/5 ML
SYRINGE (ML) INTRAVENOUS PRN
Status: DISCONTINUED | OUTPATIENT
Start: 2020-09-15 | End: 2020-09-15 | Stop reason: SDUPTHER

## 2020-09-15 RX ORDER — CEFAZOLIN SODIUM 2 G/50ML
SOLUTION INTRAVENOUS PRN
Status: DISCONTINUED | OUTPATIENT
Start: 2020-09-15 | End: 2020-09-15 | Stop reason: SDUPTHER

## 2020-09-15 RX ORDER — KETOROLAC TROMETHAMINE 30 MG/ML
INJECTION, SOLUTION INTRAMUSCULAR; INTRAVENOUS PRN
Status: DISCONTINUED | OUTPATIENT
Start: 2020-09-15 | End: 2020-09-15 | Stop reason: SDUPTHER

## 2020-09-15 RX ORDER — LIDOCAINE HYDROCHLORIDE 20 MG/ML
INJECTION, SOLUTION EPIDURAL; INFILTRATION; INTRACAUDAL; PERINEURAL PRN
Status: DISCONTINUED | OUTPATIENT
Start: 2020-09-15 | End: 2020-09-15 | Stop reason: SDUPTHER

## 2020-09-15 RX ORDER — OXYCODONE HYDROCHLORIDE AND ACETAMINOPHEN 5; 325 MG/1; MG/1
1 TABLET ORAL EVERY 4 HOURS PRN
Qty: 42 TABLET | Refills: 0 | Status: SHIPPED | OUTPATIENT
Start: 2020-09-15 | End: 2020-09-22

## 2020-09-15 RX ORDER — PROPOFOL 10 MG/ML
INJECTION, EMULSION INTRAVENOUS PRN
Status: DISCONTINUED | OUTPATIENT
Start: 2020-09-15 | End: 2020-09-15 | Stop reason: SDUPTHER

## 2020-09-15 RX ORDER — VASOPRESSIN 20 [USP'U]/ML
INJECTION, SOLUTION INTRAMUSCULAR; SUBCUTANEOUS PRN
Status: DISCONTINUED | OUTPATIENT
Start: 2020-09-15 | End: 2020-09-15 | Stop reason: SDUPTHER

## 2020-09-15 RX ORDER — CEFAZOLIN SODIUM 2 G/50ML
2 SOLUTION INTRAVENOUS ONCE
Status: DISCONTINUED | OUTPATIENT
Start: 2020-09-15 | End: 2020-09-15 | Stop reason: HOSPADM

## 2020-09-15 RX ORDER — MIDAZOLAM HYDROCHLORIDE 1 MG/ML
2 INJECTION INTRAMUSCULAR; INTRAVENOUS ONCE
Status: COMPLETED | OUTPATIENT
Start: 2020-09-15 | End: 2020-09-15

## 2020-09-15 RX ORDER — SODIUM CHLORIDE, SODIUM LACTATE, POTASSIUM CHLORIDE, CALCIUM CHLORIDE 600; 310; 30; 20 MG/100ML; MG/100ML; MG/100ML; MG/100ML
INJECTION, SOLUTION INTRAVENOUS CONTINUOUS
Status: DISCONTINUED | OUTPATIENT
Start: 2020-09-16 | End: 2020-09-15 | Stop reason: HOSPADM

## 2020-09-15 RX ORDER — BUPIVACAINE HYDROCHLORIDE 5 MG/ML
INJECTION, SOLUTION EPIDURAL; INTRACAUDAL
Status: DISCONTINUED
Start: 2020-09-15 | End: 2020-09-15 | Stop reason: HOSPADM

## 2020-09-15 RX ORDER — BUPIVACAINE HYDROCHLORIDE 5 MG/ML
INJECTION, SOLUTION EPIDURAL; INTRACAUDAL
Status: COMPLETED | OUTPATIENT
Start: 2020-09-15 | End: 2020-09-15

## 2020-09-15 RX ORDER — ONDANSETRON 2 MG/ML
4 INJECTION INTRAMUSCULAR; INTRAVENOUS
Status: DISCONTINUED | OUTPATIENT
Start: 2020-09-15 | End: 2020-09-15 | Stop reason: HOSPADM

## 2020-09-15 RX ADMIN — SODIUM CHLORIDE, POTASSIUM CHLORIDE, SODIUM LACTATE AND CALCIUM CHLORIDE: 600; 310; 30; 20 INJECTION, SOLUTION INTRAVENOUS at 16:20

## 2020-09-15 RX ADMIN — DEXAMETHASONE SODIUM PHOSPHATE 10 MG: 10 INJECTION INTRAMUSCULAR; INTRAVENOUS at 15:11

## 2020-09-15 RX ADMIN — SODIUM CHLORIDE, POTASSIUM CHLORIDE, SODIUM LACTATE AND CALCIUM CHLORIDE: 600; 310; 30; 20 INJECTION, SOLUTION INTRAVENOUS at 10:44

## 2020-09-15 RX ADMIN — BUPIVACAINE HYDROCHLORIDE 10 ML: 5 INJECTION, SOLUTION EPIDURAL; INTRACAUDAL; PERINEURAL at 13:32

## 2020-09-15 RX ADMIN — Medication 15 MG: at 15:22

## 2020-09-15 RX ADMIN — Medication 15 MG: at 15:49

## 2020-09-15 RX ADMIN — CEFAZOLIN SODIUM 2 G: 2 SOLUTION INTRAVENOUS at 15:05

## 2020-09-15 RX ADMIN — Medication 50 MCG: at 15:02

## 2020-09-15 RX ADMIN — Medication 50 MCG: at 17:05

## 2020-09-15 RX ADMIN — ONDANSETRON 4 MG: 2 INJECTION, SOLUTION INTRAMUSCULAR; INTRAVENOUS at 19:28

## 2020-09-15 RX ADMIN — ROCURONIUM BROMIDE 10 MG: 10 INJECTION, SOLUTION INTRAVENOUS at 17:05

## 2020-09-15 RX ADMIN — Medication 50 MCG: at 16:42

## 2020-09-15 RX ADMIN — OXYCODONE HYDROCHLORIDE AND ACETAMINOPHEN 1 TABLET: 5; 325 TABLET ORAL at 21:10

## 2020-09-15 RX ADMIN — BUPIVACAINE 10 ML: 13.3 INJECTION, SUSPENSION, LIPOSOMAL INFILTRATION at 13:32

## 2020-09-15 RX ADMIN — Medication 5 MG: at 17:17

## 2020-09-15 RX ADMIN — CEFAZOLIN SODIUM 2 G: 2 SOLUTION INTRAVENOUS at 19:06

## 2020-09-15 RX ADMIN — Medication 50 MCG: at 16:10

## 2020-09-15 RX ADMIN — KETOROLAC TROMETHAMINE 30 MG: 30 INJECTION, SOLUTION INTRAMUSCULAR; INTRAVENOUS at 19:27

## 2020-09-15 RX ADMIN — Medication 100 MCG: at 15:18

## 2020-09-15 RX ADMIN — ROCURONIUM BROMIDE 20 MG: 10 INJECTION, SOLUTION INTRAVENOUS at 15:43

## 2020-09-15 RX ADMIN — Medication 100 MCG: at 15:22

## 2020-09-15 RX ADMIN — Medication 15 MG: at 16:21

## 2020-09-15 RX ADMIN — VASOPRESSIN 2 UNITS: 20 INJECTION INTRAVENOUS at 15:54

## 2020-09-15 RX ADMIN — MIDAZOLAM 2 MG: 1 INJECTION INTRAMUSCULAR; INTRAVENOUS at 13:26

## 2020-09-15 RX ADMIN — LIDOCAINE HYDROCHLORIDE 5 ML: 20 INJECTION, SOLUTION EPIDURAL; INFILTRATION; INTRACAUDAL; PERINEURAL at 15:02

## 2020-09-15 RX ADMIN — ROCURONIUM BROMIDE 50 MG: 10 INJECTION, SOLUTION INTRAVENOUS at 15:02

## 2020-09-15 RX ADMIN — Medication 50 MCG: at 15:34

## 2020-09-15 RX ADMIN — PROPOFOL 70 MG: 10 INJECTION, EMULSION INTRAVENOUS at 17:05

## 2020-09-15 RX ADMIN — Medication 100 MCG: at 15:43

## 2020-09-15 RX ADMIN — PROPOFOL 150 MG: 10 INJECTION, EMULSION INTRAVENOUS at 15:02

## 2020-09-15 ASSESSMENT — PULMONARY FUNCTION TESTS
PIF_VALUE: 20
PIF_VALUE: 19
PIF_VALUE: 7
PIF_VALUE: 11
PIF_VALUE: 7
PIF_VALUE: 19
PIF_VALUE: 21
PIF_VALUE: 19
PIF_VALUE: 22
PIF_VALUE: 19
PIF_VALUE: 23
PIF_VALUE: 20
PIF_VALUE: 7
PIF_VALUE: 20
PIF_VALUE: 18
PIF_VALUE: 26
PIF_VALUE: 7
PIF_VALUE: 26
PIF_VALUE: 7
PIF_VALUE: 19
PIF_VALUE: 20
PIF_VALUE: 7
PIF_VALUE: 7
PIF_VALUE: 23
PIF_VALUE: 19
PIF_VALUE: 16
PIF_VALUE: 23
PIF_VALUE: 7
PIF_VALUE: 7
PIF_VALUE: 20
PIF_VALUE: 7
PIF_VALUE: 20
PIF_VALUE: 20
PIF_VALUE: 7
PIF_VALUE: 20
PIF_VALUE: 7
PIF_VALUE: 19
PIF_VALUE: 24
PIF_VALUE: 20
PIF_VALUE: 18
PIF_VALUE: 8
PIF_VALUE: 20
PIF_VALUE: 1
PIF_VALUE: 16
PIF_VALUE: 7
PIF_VALUE: 20
PIF_VALUE: 7
PIF_VALUE: 7
PIF_VALUE: 20
PIF_VALUE: 19
PIF_VALUE: 7
PIF_VALUE: 23
PIF_VALUE: 7
PIF_VALUE: 19
PIF_VALUE: 7
PIF_VALUE: 16
PIF_VALUE: 0
PIF_VALUE: 20
PIF_VALUE: 7
PIF_VALUE: 19
PIF_VALUE: 7
PIF_VALUE: 23
PIF_VALUE: 20
PIF_VALUE: 20
PIF_VALUE: 7
PIF_VALUE: 21
PIF_VALUE: 7
PIF_VALUE: 20
PIF_VALUE: 7
PIF_VALUE: 23
PIF_VALUE: 16
PIF_VALUE: 2
PIF_VALUE: 19
PIF_VALUE: 12
PIF_VALUE: 19
PIF_VALUE: 7
PIF_VALUE: 19
PIF_VALUE: 4
PIF_VALUE: 1
PIF_VALUE: 24
PIF_VALUE: 21
PIF_VALUE: 1
PIF_VALUE: 20
PIF_VALUE: 22
PIF_VALUE: 20
PIF_VALUE: 7
PIF_VALUE: 26
PIF_VALUE: 18
PIF_VALUE: 21
PIF_VALUE: 7
PIF_VALUE: 23
PIF_VALUE: 16
PIF_VALUE: 7
PIF_VALUE: 7
PIF_VALUE: 19
PIF_VALUE: 20
PIF_VALUE: 19
PIF_VALUE: 20
PIF_VALUE: 7
PIF_VALUE: 18
PIF_VALUE: 18
PIF_VALUE: 20
PIF_VALUE: 7
PIF_VALUE: 7
PIF_VALUE: 21
PIF_VALUE: 20
PIF_VALUE: 19
PIF_VALUE: 20
PIF_VALUE: 19
PIF_VALUE: 23
PIF_VALUE: 8
PIF_VALUE: 6
PIF_VALUE: 20
PIF_VALUE: 7
PIF_VALUE: 7
PIF_VALUE: 15
PIF_VALUE: 7
PIF_VALUE: 19
PIF_VALUE: 7
PIF_VALUE: 22
PIF_VALUE: 14
PIF_VALUE: 19
PIF_VALUE: 18
PIF_VALUE: 7
PIF_VALUE: 18
PIF_VALUE: 20
PIF_VALUE: 19
PIF_VALUE: 7
PIF_VALUE: 20
PIF_VALUE: 7
PIF_VALUE: 18
PIF_VALUE: 7
PIF_VALUE: 20
PIF_VALUE: 17
PIF_VALUE: 7
PIF_VALUE: 7
PIF_VALUE: 25
PIF_VALUE: 7
PIF_VALUE: 8
PIF_VALUE: 20
PIF_VALUE: 22
PIF_VALUE: 7
PIF_VALUE: 22
PIF_VALUE: 7
PIF_VALUE: 20
PIF_VALUE: 19
PIF_VALUE: 20
PIF_VALUE: 20
PIF_VALUE: 7
PIF_VALUE: 7
PIF_VALUE: 20
PIF_VALUE: 7
PIF_VALUE: 20
PIF_VALUE: 20
PIF_VALUE: 7
PIF_VALUE: 21
PIF_VALUE: 24
PIF_VALUE: 16
PIF_VALUE: 20
PIF_VALUE: 8
PIF_VALUE: 20
PIF_VALUE: 19
PIF_VALUE: 18
PIF_VALUE: 3
PIF_VALUE: 21
PIF_VALUE: 7
PIF_VALUE: 20
PIF_VALUE: 7
PIF_VALUE: 24
PIF_VALUE: 7
PIF_VALUE: 7
PIF_VALUE: 23
PIF_VALUE: 19
PIF_VALUE: 7
PIF_VALUE: 17
PIF_VALUE: 19
PIF_VALUE: 20
PIF_VALUE: 7
PIF_VALUE: 7
PIF_VALUE: 20
PIF_VALUE: 18
PIF_VALUE: 10
PIF_VALUE: 7
PIF_VALUE: 20
PIF_VALUE: 8
PIF_VALUE: 20
PIF_VALUE: 7
PIF_VALUE: 19
PIF_VALUE: 22
PIF_VALUE: 20
PIF_VALUE: 20
PIF_VALUE: 7
PIF_VALUE: 7
PIF_VALUE: 20
PIF_VALUE: 7
PIF_VALUE: 20
PIF_VALUE: 22
PIF_VALUE: 20
PIF_VALUE: 20
PIF_VALUE: 7
PIF_VALUE: 20
PIF_VALUE: 26
PIF_VALUE: 19
PIF_VALUE: 7
PIF_VALUE: 20
PIF_VALUE: 23
PIF_VALUE: 20
PIF_VALUE: 19
PIF_VALUE: 20
PIF_VALUE: 19
PIF_VALUE: 22
PIF_VALUE: 8
PIF_VALUE: 22
PIF_VALUE: 16
PIF_VALUE: 17
PIF_VALUE: 7
PIF_VALUE: 7
PIF_VALUE: 22
PIF_VALUE: 17
PIF_VALUE: 7
PIF_VALUE: 7
PIF_VALUE: 20
PIF_VALUE: 21
PIF_VALUE: 17
PIF_VALUE: 20
PIF_VALUE: 19
PIF_VALUE: 20
PIF_VALUE: 7
PIF_VALUE: 19
PIF_VALUE: 24
PIF_VALUE: 7
PIF_VALUE: 19
PIF_VALUE: 23
PIF_VALUE: 7
PIF_VALUE: 7
PIF_VALUE: 20
PIF_VALUE: 22
PIF_VALUE: 20
PIF_VALUE: 20
PIF_VALUE: 7
PIF_VALUE: 20
PIF_VALUE: 7
PIF_VALUE: 20
PIF_VALUE: 19
PIF_VALUE: 23
PIF_VALUE: 7
PIF_VALUE: 3
PIF_VALUE: 7
PIF_VALUE: 7
PIF_VALUE: 20
PIF_VALUE: 21
PIF_VALUE: 7
PIF_VALUE: 7
PIF_VALUE: 23
PIF_VALUE: 3
PIF_VALUE: 20
PIF_VALUE: 7
PIF_VALUE: 18
PIF_VALUE: 4
PIF_VALUE: 7
PIF_VALUE: 20
PIF_VALUE: 20
PIF_VALUE: 24
PIF_VALUE: 10
PIF_VALUE: 23
PIF_VALUE: 23
PIF_VALUE: 20
PIF_VALUE: 16
PIF_VALUE: 22
PIF_VALUE: 19
PIF_VALUE: 7
PIF_VALUE: 10
PIF_VALUE: 22
PIF_VALUE: 9
PIF_VALUE: 23
PIF_VALUE: 16
PIF_VALUE: 7
PIF_VALUE: 21
PIF_VALUE: 7
PIF_VALUE: 18
PIF_VALUE: 21
PIF_VALUE: 5
PIF_VALUE: 19
PIF_VALUE: 7
PIF_VALUE: 17
PIF_VALUE: 24
PIF_VALUE: 19

## 2020-09-15 ASSESSMENT — PAIN DESCRIPTION - ONSET: ONSET: ON-GOING

## 2020-09-15 ASSESSMENT — PAIN SCALES - GENERAL
PAINLEVEL_OUTOF10: 0
PAINLEVEL_OUTOF10: 7
PAINLEVEL_OUTOF10: 6
PAINLEVEL_OUTOF10: 6
PAINLEVEL_OUTOF10: 8
PAINLEVEL_OUTOF10: 6

## 2020-09-15 ASSESSMENT — PAIN DESCRIPTION - FREQUENCY: FREQUENCY: CONTINUOUS

## 2020-09-15 ASSESSMENT — PAIN - FUNCTIONAL ASSESSMENT: PAIN_FUNCTIONAL_ASSESSMENT: 0-10

## 2020-09-15 ASSESSMENT — PAIN DESCRIPTION - DESCRIPTORS
DESCRIPTORS: BURNING
DESCRIPTORS: ACHING;SORE

## 2020-09-15 ASSESSMENT — PAIN DESCRIPTION - PAIN TYPE: TYPE: SURGICAL PAIN

## 2020-09-15 ASSESSMENT — PAIN DESCRIPTION - ORIENTATION: ORIENTATION: LEFT

## 2020-09-15 ASSESSMENT — PAIN DESCRIPTION - PROGRESSION: CLINICAL_PROGRESSION: NOT CHANGED

## 2020-09-15 ASSESSMENT — PAIN DESCRIPTION - LOCATION: LOCATION: SHOULDER

## 2020-09-15 NOTE — ANESTHESIA PROCEDURE NOTES
Peripheral Block    Patient location during procedure: pre-op  Start time: 9/15/2020 1:25 PM  End time: 9/15/2020 1:32 PM  Staffing  Anesthesiologist: Fab Palma MD  Performed: anesthesiologist   Preanesthetic Checklist  Completed: patient identified, site marked, surgical consent, pre-op evaluation, timeout performed, IV checked, risks and benefits discussed, monitors and equipment checked, anesthesia consent given, oxygen available and patient being monitored  Peripheral Block  Patient position: supine  Prep: ChloraPrep  Patient monitoring: cardiac monitor, continuous pulse ox, frequent blood pressure checks and IV access  Block type: Brachial plexus  Laterality: left  Injection technique: single-shot  Procedures: ultrasound guided  Local infiltration: lidocaine  Infiltration strength: 1 %  Dose: 2 mL  Interscalene  Provider prep: mask and sterile gloves  Local infiltration: lidocaine  Needle  Needle type: pencil-tip   Needle gauge: 20 G  Needle localization: ultrasound guidance  Assessment  Injection assessment: negative aspiration for heme and local visualized surrounding nerve on ultrasound  Paresthesia pain: immediately resolved  Slow fractionated injection: yes  Hemodynamics: stable  Medications Administered  Bupivacaine (MARCAINE) PF injection 0.5%, 10 mL  bupivacaine liposome (EXPAREL) injection 1.3%, 10 mL  Reason for block: procedure for pain, post-op pain management and at surgeon's request

## 2020-09-15 NOTE — H&P
History and Physical Update    Pt Name: Diya Mcwilliams  MRN: [id number]  YOB: 1965  Date of evaluation: 9/15/2020      [x] I have reviewed the Orthopedic Progress Note by Dr Julio César Redmond dated 9/10/20 in epic which meets the criteria for an Interval History and Physical note and is attached below. [x] I have examined  Diya Mcwilliams  There are no changes to the patient who is scheduled for a left shoulder arthroscopy with rotator cuff repair by Dr Julio César Redmond for left rotator cuff tear. Smoker with last cigarette 9pm last night. The patient denies new health changes, fever, chills, wheezing, cough, increased SOB, chest pain, open sores or wounds. Vital signs: BP (!) 145/80   Pulse 77   Temp 97.3 °F (36.3 °C) (Temporal)   Resp 18   Ht 6' 4\" (1.93 m)   Wt 194 lb (88 kg)   SpO2 98%   BMI 23.61 kg/m²     Allergies:  Patient has no known allergies. Medications:    Prior to Admission medications    Medication Sig Start Date End Date Taking? Authorizing Provider   lisinopril (PRINIVIL;ZESTRIL) 20 MG tablet TAKE 1 TABLET BY MOUTH ONE TIME A DAY 4/22/20  Yes Sylwia Weber PA-C   atorvastatin (LIPITOR) 20 MG tablet TAKE 1 TABLET BY MOUTH ONE TIME A DAY  Patient taking differently: daily TAKE 1 TABLET BY MOUTH ONE TIME A DAY 4/22/20  Yes Sylwia Weber PA-C   ibuprofen (ADVIL;MOTRIN) 200 MG tablet Take 200 mg by mouth every 6 hours as needed for Pain    Historical Provider, MD   Omega-3 Fatty Acids (FISH OIL) 1000 MG CAPS Take 3,000 mg by mouth 3 times daily    Historical Provider, MD         This is a 53 y.o. male who is pleasant, cooperative, alert and oriented x3, in no acute distress. Heart: Heart sounds are normal.  HR 77 regular rate and rhythm without murmur, gallop or rub. Lungs: Normal respiratory effort with equal expansion, unlabored w/o use of accessory muscles, wheezes or rales bilaterally   Abdomen: nontender, nondistended with bowel sounds .    Extremities: radial pulses and hand for a MRI review. Review of Systems   Constitutional: Positive for activity change. Negative for appetite change, fatigue and fever. Respiratory: Negative. Negative for apnea, cough, chest tightness and shortness of breath. Cardiovascular: Negative. Negative for chest pain, palpitations and leg swelling. Gastrointestinal: Negative for abdominal distention, abdominal pain, constipation, diarrhea, nausea and vomiting. Genitourinary: Negative for difficulty urinating, dysuria and hematuria. Musculoskeletal: Positive for arthralgias. Negative for gait problem, joint swelling and myalgias. Skin: Negative for color change and rash. Neurological: Positive for weakness. Negative for dizziness, numbness and headaches. Psychiatric/Behavioral: Positive for sleep disturbance. Past Medical History:    Past Medical History        Past Medical History:   Diagnosis Date    Hyperlipidemia      Hypertension      TIA (transient ischemic attack)       Early 1990's    Wears dentures       Upper only at present time.  Wears glasses             Past Surgical History:    Past Surgical History         Past Surgical History:   Procedure Laterality Date    EYE SURGERY         Removal of foreign object in Right eye in 1980's per pt.     ROTATOR CUFF REPAIR Left 2011           CurrentMedications:   Current Facility-Administered Medications          Current Outpatient Medications   Medication Sig Dispense Refill    ibuprofen (ADVIL;MOTRIN) 200 MG tablet Take 200 mg by mouth every 6 hours as needed for Pain        lisinopril (PRINIVIL;ZESTRIL) 20 MG tablet TAKE 1 TABLET BY MOUTH ONE TIME A DAY 90 tablet 1    atorvastatin (LIPITOR) 20 MG tablet TAKE 1 TABLET BY MOUTH ONE TIME A DAY (Patient taking differently: daily TAKE 1 TABLET BY MOUTH ONE TIME A DAY) 90 tablet 1    Omega-3 Fatty Acids (FISH OIL) 1000 MG CAPS Take 3,000 mg by mouth 3 times daily          No current facility-administered medications for this visit. Allergies:    Patient has no known allergies. Social History:   Social History   Social History            Socioeconomic History    Marital status:        Spouse name: None    Number of children: None    Years of education: None    Highest education level: None   Occupational History    None   Social Needs    Financial resource strain: None    Food insecurity       Worry: None       Inability: None    Transportation needs       Medical: None       Non-medical: None   Tobacco Use    Smoking status: Current Every Day Smoker       Packs/day: 0.50       Years: 15.00       Pack years: 7.50       Types: Cigarettes    Smokeless tobacco: Never Used   Substance and Sexual Activity    Alcohol use: Yes       Alcohol/week: 12.0 standard drinks       Types: 12 Cans of beer per week    Drug use: No    Sexual activity: None   Lifestyle    Physical activity       Days per week: None       Minutes per session: None    Stress: None   Relationships    Social connections       Talks on phone: None       Gets together: None       Attends Hinduism service: None       Active member of club or organization: None       Attends meetings of clubs or organizations: None       Relationship status: None    Intimate partner violence       Fear of current or ex partner: None       Emotionally abused: None       Physically abused: None       Forced sexual activity: None   Other Topics Concern    None   Social History Narrative    None           Family History:  Family History         Family History   Problem Relation Age of Onset    Alzheimer's Disease Mother      Hypertension Maternal Grandfather      Cancer Neg Hx             I have reviewed the CC, HPI, ROS, PMH, FHX, Social History, and if not present in this note, I have reviewed in the patient's chart.    I agree with the documentation provided by other staff and have reviewed their documentation prior to providing my signature indicating agreement. Vitals:   BP (!) 148/99   Pulse 82   Ht 6' 4\" (1.93 m)   Wt 194 lb (88 kg)   BMI 23.61 kg/m²  Body mass index is 23.61 kg/m². Physical Examination:      Orthopedics:     GENERAL: Alert and oriented X3 in no acute distress. SKIN: Intact without lesions or ulcerations. NEURO: Musculoskeletal and axillary nerves intact to sensory and motor testing. VASC: Capillary refill is less than 3 seconds. Left Shoulder Exam     GEN: Alert and oriented X 3, in no acute distress. SKIN: Intact without rashes, lesions, or ulcerations. NEURO: Musculoskeletal ans axillary nerves intact to sensory and motor testing. VASC: Cap refill less than than 3 secs. Negative Adson's test, Negative Silvia's test.  ROM: actively 100/passively 140 degrees of forward elevation, 30 degrees of external rotation in neutral, 80 degrees of external rotation in abduction, internal rotation to L1  STRENGTH: Supraspinatus 3/5, external rotators 4/5. MUSC: No atrophy, negative subscap lift off or belly press test.  IMP: (+) Neer's sign, (+) Hawkin's sign, (+) Coracoid impingement, no painful arc, no pain with cross body abduction. PALP: no pain over anterolateral acromion, no pain over AC joint, no pain over traps/rhomboids. INST: (-) Grant's test, (-) Speed's test, Lag sign. No external rotation lag, (-) hornblower's sign. Assessment:      1.  Traumatic complete tear of left rotator cuff, subsequent encounter      Procedures:    Procedure: no  Radiology:   Mri Shoulder Left Wo Contrast     Result Date: 9/3/2020  EXAMINATION: MRI OF THE LEFT SHOULDER WITHOUT CONTRAST   9/3/2020 4:56 pm TECHNIQUE: Multiplanar multisequence MRI of the left shoulder was performed without the administration of intravenous contrast. COMPARISON: MR arthrogram left shoulder January 25, 2011 HISTORY: ORDERING SYSTEM PROVIDED HISTORY: Left shoulder pain, unspecified chronicity TECHNOLOGIST PROVIDED HISTORY: rotator cuff tear Reason for Exam: Left shoulder pain, unspecified chronicity Acuity: Acute Type of Exam: Initial Additional signs and symptoms: pain, tightness, previous surgery roator cuff Relevant Medical/Surgical History: pt fell 8/27/20, landing on elbow jamming shoulder FINDINGS: ROTATOR CUFF: Postoperative changes of prior rotator cuff repair. Full-thickness re-tear of the supraspinatus tendon with associated severe narrowing of the acromial humeral interval.  Torn fibers are retracted to the glenohumeral joint (image 14 series 9). Interstitial tearing of the infraspinatus with fluid signal extending along the myotendinous junction. There is also a diffusely attenuated appearance of the infraspinatus tendon with intact fibers present. Partial thickness tearing of the subscapularis tendon superimposed on moderate to severe tendinosis. Teres minor is intact. Moderate atrophy of the supraspinatus and subscapularis muscles and mild-to-moderate atrophy of the infraspinatus. Intramuscular edema predominant involving the teres minor and to a lesser extent the infraspinatus muscle. Mild edema also present within the deltoid muscle. BICEPS TENDON: Long head biceps tendon remains intact. Tendinosis of the intracapsular portion of the tendon. LABRUM: To the extent that the labrum can be visualized on the current exam, there is underlying labral degeneration predominant along the superior labrum. No paralabral cyst identified. GLENOHUMERAL JOINT: Anatomic alignment of the glenohumeral joint with mild degenerative change. No significant effusion. AC JOINT AND ACROMIOCLAVICULAR ARCH: Moderate acromioclavicular osteoarthritis. No os acromiale identified. Small amount of fluid within the subacromial/subdeltoid bursa. BONE MARROW: Large subchondral cystic change along the lesser tuberosity of the humerus. No suspicious marrow occupying lesions. Degenerate marrow signal changes of the acromioclavicular joint. No fracture identified.  OUTLET SPACES: The suprascapular notch and quadrilateral space are without obstructing or space occupying lesions. 1. Status post rotator cuff repair with full-thickness re-tear of the supraspinatus tendon. 2. Diffuse and pronounced interstitial tearing of the infraspinatus tendon. 3. Partial thickness tearing of the subscapularis tendon superimposed on moderate to severe tendinosis. 4. Mild glenohumeral and moderate acromioclavicular osteoarthritis. 5. Large subchondral cystic change along the lesser tuberosity of the humerus. No acute osseous abnormality identified. 6. Intramuscular edema of the teres minor and to a lesser extent the infraspinatus and deltoid muscle which may reflect mild muscular strains. Plan:   Surgical Plan: We discussed the natural etiologies and histories of re-tear of his rotator cuff. We also discussed the various alternatives of medical treatment including physical therapy, injections, anti-inflammatory drugs and as a last resort surgery. During today's face to face encounter, we discussed the details of undergoing a left shoulder arthroscopy surgery. I explained to the patient that He should not eat anything after midnight the night before surgery. He should wash his body with the Hibiclens soap that He was given to reduce the risks of infection. I also instructed the patient not to shave at all this week because we do not want any nicks to develop on the skin for it will increase the risk of infection. The patient will need the following durable medical equipment: a front wheeled walker, toilet commode, and a shower chair to aid in their post operative course. In addition, I explained to the patient that like any other surgery, there are risks, such as: infection, pain, nerve and artery damage, bleeding, lack of muscle stimulus, stiffness, and lastly, a need for further surgery.  I also explained to the patient that He will be offered a nerve block and I highly recommend it to patients because it helps reduce the pain during surgery and after surgery. However, it is up to the patient to make that choice for the nerve block and the patient was made aware of that. At this time, the patient has opted for and has decided to undergo a left shoulder arthroscopy surgery. A consent form was signed by the patient today. All the details of the procedure were discussed with the patient and all questions and concerns were answered. The patient was also instructed to make sure that they stop any NSAID's the week before surgery to reduce the risk of complications and to prevent the impairment of the natural healing process of the body. Lastly, He is up and walking and He was also instructed to get up and get moving every hour He is awake to prevent the occurrence of a blood clot. Patient should return to the office 1 week after surgery with [facility] for a post operative f/u appointment with Deion Stroud PA-C. Encounter Medications    No orders of the defined types were placed in this encounter. No orders of the defined types were placed in this encounter. Ronnie Wadsworth PA-C, am scribing for and in the presence of Alexa Gonzalez D.O.. 9/13/2020  3:13 PM        I, Alexa Gonzalez DO, have personally seen this patient and I have reviewed the CC, PMH, FHX and Social History as provided by other clinical staff. I reassessed the HPI and ROS as scribed by Deion Stroud PA-C in my presence and it is both accurate and complete. Thereafter, I personally performed the PE, reviewed the imaging and established the DX and POC. I agree with the documentation provided by the Physician Assistant. I have reviewed all documentation in its entirety prior to providing my signature indicating agreement. Any areas of disagreement are noted on the chart.      Electronically signed by Kaylee Goncalves DO on 9/13/2020 at 3:13 PM           Electronically signed by Kaylee Goncalves DO, on 9/13/2020 at 3:13 PM Revision History

## 2020-09-15 NOTE — BRIEF OP NOTE
Brief Postoperative Note      Patient: Nora Fernandez  YOB: 1965  MRN: [id number]    Date of Procedure: 9/15/2020    Pre-Op Diagnosis:   1. Left rotator cuff tear    Post-Op Diagnosis:   1. Left rotator cuff tear  2. Extensive scar tissue formation       Procedure(s):  1. Left shoulder arthroscopy with extensive debridement  2. Superior capsular reconstruction    Surgeon(s):  Kt Morgan DO    Assistant:  Resident: Evita Silverman MD; Donelda Goldberg, DO    Anesthesia: General    Estimated Blood Loss (mL): 25 cc    Fluids: 2000 cc lactated ringers    UOP: 471 cc    Complications: None    Specimens:   * No specimens in log *    Implants:  Implant Name Type Inv. Item Serial No.  Lot No. LRB No. Used Action   ZACK-DERMIS DECELLULARIZED [id number] Bone/Graft/Tissue/Human/Synth ZACK-DERMIS DECELLULARIZED [id number]  [employer] [id number] Left 1 Implanted   ANCHOR BONE LIGAMENT FIBERTAK Fastener ANCHOR BONE LIGAMENT FIBERTAK  [employer]  Left 1 Implanted   ANCHOR BONE LIGAMENT FIBERTAK Fastener ANCHOR BONE LIGAMENT FIBERTAK  [employer]  Left 1 Implanted   ANCHOR SUTURE SWIVELOCK 4.75X19.1 BIOCOMPOSITE MIN 5EA Fastener ANCHOR SUTURE SWIVELOCK 4.75X19.1 BIOCOMPOSITE MIN 5EA  [employer] [id number] Left 2 Implanted   IMPL SYS W/BIOCOMP SWIVLK SPEEDBRDG Fastener IMPL SYS W/BIOCOMP SWIVLK SPEEDBRDG  [employer] [id number] Left 1 Implanted   BIOCOMPOSITE KNOTLESS SWIVELLODK [id number] 4.75 X 19.1MM WITH BLUE FIBER TAPE     [id number] Left 1 Implanted         Drains:   Urethral Catheter Non-latex 16 fr (Active)       Findings: Massive rotator cuff that was mobile with prior sutures. See operative report for details.     Electronically signed by Donelda Goldberg, DO on 9/15/2020 at 7:40 PM

## 2020-09-15 NOTE — ANESTHESIA PRE PROCEDURE
Department of Anesthesiology  Preprocedure Note       Name:  Bella Coronado   Age:  53 y.o.  :  1965                                          MRN:  [id number]         Date:  9/15/2020      Surgeon: Dio Hassan):  Santosh Rodriguez DO    Procedure: Procedure(s):  LEFT SHOULDER ARTHROSCOPY WITH ROTATOR CUFF REPAIR      [employer]    Medications prior to admission:   Prior to Admission medications    Medication Sig Start Date End Date Taking?  Authorizing Provider   lisinopril (PRINIVIL;ZESTRIL) 20 MG tablet TAKE 1 TABLET BY MOUTH ONE TIME A DAY 20  Yes Sylwia Weber PA-C   atorvastatin (LIPITOR) 20 MG tablet TAKE 1 TABLET BY MOUTH ONE TIME A DAY  Patient taking differently: daily TAKE 1 TABLET BY MOUTH ONE TIME A DAY 20  Yes Sylwia Weber PA-C   ibuprofen (ADVIL;MOTRIN) 200 MG tablet Take 200 mg by mouth every 6 hours as needed for Pain    Historical Provider, MD   Omega-3 Fatty Acids (FISH OIL) 1000 MG CAPS Take 3,000 mg by mouth 3 times daily    Historical Provider, MD       Current medications:    Current Facility-Administered Medications   Medication Dose Route Frequency Provider Last Rate Last Dose    [START ON 2020] lactated ringers infusion   Intravenous Continuous Zhengphus MD Kathryn 50 mL/hr at 09/15/20 1044      [START ON 2020] lidocaine PF 1 % injection 1 mL  1 mL Intradermal Once PRN Kristina Peralta MD        ceFAZolin (ANCEF) 2 g in dextrose 3 % 50 mL IVPB (duplex)  2 g Intravenous Once Santosh Rodriguez DO        midazolam (VERSED) injection 2 mg  2 mg Intravenous Once Rudy Novoa MD        bupivacaine liposome (EXPAREL) 1.3 % injection 133 mg  10 mL Subcutaneous Once Rudy Novoa MD           Allergies:  No Known Allergies    Problem List:    Patient Active Problem List   Diagnosis Code    Hypertension I10    Hyperlipidemia E78.5       Past Medical History:        Diagnosis Date    Hyperlipidemia     Hypertension     TIA (transient ischemic attack)     Early     Wears dentures Upper only at present time.  Wears glasses        Past Surgical History:        Procedure Laterality Date    EYE SURGERY      Removal of foreign object in Right eye in 1980's per pt.  ROTATOR CUFF REPAIR Left 2011       Social History:    Social History     Tobacco Use    Smoking status: Current Every Day Smoker     Packs/day: 0.50     Years: 15.00     Pack years: 7.50     Types: Cigarettes    Smokeless tobacco: Never Used   Substance Use Topics    Alcohol use: Yes     Alcohol/week: 12.0 standard drinks     Types: 12 Cans of beer per week                                Ready to quit: Not Answered  Counseling given: Not Answered      Vital Signs (Current):   Vitals:    09/15/20 1008 09/15/20 1020   BP: (!) 145/80    Pulse: 77    Resp: 18    Temp: 97.3 °F (36.3 °C)    TempSrc: Temporal    SpO2: 98%    Weight:  194 lb (88 kg)   Height:  6' 4\" (1.93 m)                                              BP Readings from Last 3 Encounters:   09/15/20 (!) 145/80   09/09/20 134/85   09/10/20 (!) 148/99       NPO Status: Time of last liquid consumption: 2230                        Time of last solid consumption: 2230                        Date of last liquid consumption: 09/14/20                        Date of last solid food consumption: 09/14/20    BMI:   Wt Readings from Last 3 Encounters:   09/15/20 194 lb (88 kg)   09/09/20 194 lb (88 kg)   09/10/20 194 lb (88 kg)     Body mass index is 23.61 kg/m².     CBC:   Lab Results   Component Value Date    WBC 11.7 09/09/2020    RBC 4.31 09/09/2020    HGB 14.2 09/09/2020    HCT 41.3 09/09/2020    MCV 95.8 09/09/2020    RDW 14.0 09/09/2020     09/09/2020       CMP:   Lab Results   Component Value Date     09/09/2020    K 3.8 09/09/2020     09/09/2020    CO2 24 09/09/2020    BUN 12 09/09/2020    CREATININE 0.82 09/09/2020    GFRAA >60 09/09/2020    LABGLOM >60 09/09/2020    GLUCOSE 95 07/16/2019    PROT 7.1 07/16/2019    CALCIUM 9.5 07/16/2019    BILITOT <0.10 07/16/2019    ALKPHOS 91 07/16/2019    AST 31 07/16/2019    ALT 31 07/16/2019       POC Tests: No results for input(s): POCGLU, POCNA, POCK, POCCL, POCBUN, POCHEMO, POCHCT in the last 72 hours. Coags:   Lab Results   Component Value Date    PROTIME 10.8 02/04/2013    INR 1.0 02/04/2013    APTT 27.8 02/04/2013       HCG (If Applicable): No results found for: PREGTESTUR, PREGSERUM, HCG, HCGQUANT     ABGs: No results found for: PHART, PO2ART, UBR3SHE, ENP1XKW, BEART, X5QBDWIO     Type & Screen (If Applicable):  No results found for: LABABO, LABRH    Drug/Infectious Status (If Applicable):  No results found for: HIV, HEPCAB    COVID-19 Screening (If Applicable):   Lab Results   Component Value Date    COVID19 Not Detected 09/11/2020         Anesthesia Evaluation   no history of anesthetic complications:   Airway: Mallampati: II  TM distance: >3 FB   Neck ROM: full  Mouth opening: > = 3 FB Dental:          Pulmonary:normal exam                               Cardiovascular:  Exercise tolerance: good (>4 METS),   (+) hypertension:,     (-)  angina       Beta Blocker:  Not on Beta Blocker         Neuro/Psych:      (-) TIA (pt denies )           GI/Hepatic/Renal: Neg GI/Hepatic/Renal ROS            Endo/Other:                     Abdominal:           Vascular:                                        Anesthesia Plan      general and regional     ASA 2       Induction: intravenous. MIPS: Postoperative opioids intended and Prophylactic antiemetics administered. Anesthetic plan and risks discussed with patient. Plan discussed with CRNA.     Attending anesthesiologist reviewed and agrees with Leann Jane MD   9/15/2020

## 2020-09-16 ENCOUNTER — TELEPHONE (OUTPATIENT)
Dept: ORTHOPEDIC SURGERY | Age: 55
End: 2020-09-16

## 2020-09-16 RX ORDER — MAGNESIUM HYDROXIDE 1200 MG/15ML
LIQUID ORAL CONTINUOUS PRN
Status: COMPLETED | OUTPATIENT
Start: 2020-09-15 | End: 2020-09-15

## 2020-09-16 NOTE — TELEPHONE ENCOUNTER
Spoke to Rocael and his wife today. He is concerned about numbness of the hand and some swelling. The swelling is to be expected. He states the whole hand is numb and the shoulder feels fine. I would say the residual numbness is from the pain block he had before surgery. The numbness should continue to resolve with time--if not better in 24 hours I want to hear back from them. Try not to rest the elbow on armchair or pillow   Continue to have some pain medicine on board as when the block wears off he can suddenly develop miserable pain. We do not want to charels the pain. Make sure he continues to move the hand wrist and elbow  Ice for pain and swelling.

## 2020-09-16 NOTE — ANESTHESIA POSTPROCEDURE EVALUATION
Department of Anesthesiology  Postprocedure Note    Patient: Keyana Joy  MRN: [id number]  [place]: 1965  Date of evaluation: 9/15/2020  Time:  8:21 PM     Procedure Summary     Date:  09/15/20 Room / Location:  [address] [place] [address] / [facility] - INPATIENT    Anesthesia Start:  1455 Anesthesia Stop:  2007    Procedure:  LEFT SHOULDER ARTHROSCOPY WITH ROTATOR CUFF REPAIR SUPERIOR CAPSULAR RECONSTRUCTION EXTENSIVE DEBRIDEMENT (Left Shoulder) Diagnosis:  (DX LEFT ROTATOR  CUFF TEAR)    Surgeon:  Renaldo Reyes DO Responsible Provider:  Matti Purvis MD    Anesthesia Type:  general, regional ASA Status:  2          Anesthesia Type: general, regional    Tiara Phase I: Tiara Score: 8    Tiara Phase II:      Last vitals: Reviewed and per EMR flowsheets.        Anesthesia Post Evaluation    Patient location during evaluation: PACU  Complications: no

## 2020-09-21 ENCOUNTER — HOSPITAL ENCOUNTER (OUTPATIENT)
Dept: PHYSICAL THERAPY | Facility: CLINIC | Age: 55
Setting detail: THERAPIES SERIES
Discharge: HOME | End: 2020-09-21
Payer: COMMERCIAL

## 2020-09-21 PROCEDURE — 97110 THERAPEUTIC EXERCISES: CPT

## 2020-09-21 PROCEDURE — 97161 PT EVAL LOW COMPLEX 20 MIN: CPT

## 2020-09-21 NOTE — CONSULTS
[] [address]  [address]  [phone]  F: [phone] [x] [address]  [facility]   [address]  P: [phone]  F: [phone] [] [place]  [address] [facility]  P: [phone]  F: [phone] [] [address]  [facility]   [address]   [place]: [phone]  F: [phone]      Physical Therapy Upper Extremity Evaluation    Date:  2020  Patient: Perla Sullivan  : 1965  MRN: [id number]  Physician: Naatly Coyle D.O. [place] [facility] [place]  Medical Diagnosis: s/p Left shoulder superior capsule reconstruction; RC repair  Rehab Codes: P73.377, M25.612, [id number]  Onset Date: fall 20; surgery 9/15/20   Next 's appt: 2020    Subjective:   CC/HPI: (onset date): Pt is a 53 y.o. male who reports falling on 2020, sustaining an injury to his Left shoulder. He had previously injured his  Left shoulder in  secondary to a fall over the Winter. Surgery for  Left shoulder superior capsule reconstruction with rotator cuff incorporation on 9/15/2020, therefore he is 1 week post op 2020. His cc is superior shoulder pain, current pain rating 5/10. He reports he is having difficulty sleeping. He arrives wearing a sling and swathe in good repair. He reports some numbness and swelling in Left hand but notes that has improved somewhat.      PMHx: [] Unremarkable [] Diabetes [x] HTN  [] Pacemaker   [] MI/Heart Problems [] Cancer [] Arthritis [] Other:              [x] Refer to full medical chart  In EPIC       Comorbidities:   [] Obesity [] Dialysis  [] Other:   [] Asthma/COPD [] Dementia [] Other:   [] Stroke [] Sleep apnea [] Other:   [] Vascular disease [] Rheumatic disease [] Other:     Tests: [] X-Ray: [x] MRI:  [] Other:    Medications: [x] Refer to full [] [] []    SH Rhythm [] [] []    INSPECTION/PALPATION       SC/AC Joint [] [] []    Supraspinatus [] [] []    Biceps tendon/groove [] [] []    Posterior shld [] [] []    Subscapularis [] [] []    NEUROLOGICAL       Cervical ROM/Quadrant [x] [] []    Reflexes [] [] []    Compression/Distraction [] [] []    Sensation [] [] []      Functional Test: upper extremity functional index Score:57/80   28.75% functionally impaired     Comments: Yaya Arevalo has difficulty relaxing during PROM; increased pain noted when returning arm to neutral from elevated position (passively)    Assessment:   Pt would continue to benefit from skilled PT interventions to decrease pain, increase strength, ROM, and overall functional mobility for improved quality of life. Problems:    [x] ? Pain:5/10 Left shoulder  [x] ? ROM: limited all planes  [x] Strength:deferred testing d/t surgery  [x] ? Function: Upper extremity functional index 57/80; unable to raise Left arm; difficulty with ADL's  [x] Other:  Numbness in finger tips-has improved since surgery    STG: (to be met in 8 treatments)  1. ? Pain: reduce Left shoulder pain to 3/10 for ADL's  2. ? ROM: AAROM to 120° elevation when cleared per Dr Volodymyr Sanches  3. ? Strength: initiate light strengthening when cleared per Dr Volodymyr Sanches  4. ? Function: improve UEFI score for dressing to no difficulty  5. Patient to be independent with home exercise program as demonstrated by performance with correct form without cues. 6. Demonstrate Knowledge of fall prevention  LTG: (to be met in 16 treatments)  1. When cleared for AROM, Pt will achieve 120° seated elevation with good SH rhythm   2. ER strength to 4-/5  3.  Improved sleep- improve UEFI score for sleeping to little difficulty                   Patient goals: relieve movement    Rehab Potential:  [x] Good  [] Fair  [] Poor   Suggested Professional Referral:  [x] No  [] Yes:  Barriers to Goal Achievement:  [x] No  [] Yes:  Domestic Concerns:  [x] No  [] Yes: Pt. Education:  [x] Plans/Goals, Risks/Benefits discussed  [] Home exercise program  Method of Education: [x] Verbal  [] Demo  [] Written  Comprehension of Education:  [] Verbalizes understanding. [] Demonstrates understanding. [] Needs Review. [] Demonstrates/verbalizes understanding of HEP/Ed previously given. Treatment Plan:  [x] Therapeutic Exercise   09752  [] Iontophoresis: 4 mg/mL Dexamethasone Sodium Phosphate  mAmin  06483   [] Therapeutic Activity  72064 [x] Vasopneumatic cold with compression  36513    [] Gait Training   53492 [] Ultrasound   57796   [] Neuromuscular Re-education  17833 [] Electrical Stimulation Unattended  14351   [] Manual Therapy  76609 [] Electrical Stimulation Attended  35891   [x] Instruction in HEP  [] Lumbar/Cervical Traction  64131   [] Aquatic Therapy   96107 [] Cold/hotpack    [] Massage   29013      [] Dry Needling, 1 or 2 muscles  48285   [] Biofeedback, first 15 minutes   13106  [] Biofeedback, additional 15 minutes   66439 [] Dry Needling, 3 or more muscles  32151     []  Medication allergies reviewed for use of    Dexamethasone Sodium Phosphate 4mg/ml     with iontophoresis treatments. Pt is not allergic.        Frequency: 2-3 x/week for 16 visits    Todays Treatment:  Modalities:   Precautions:  Exercises:  Exercise Reps/ Time Weight/ Level Comments         Pendulum exercises 20xea  CW/CCW         PROM- flexion x30                   elevation x20                  ER  x20                  ABD x20                  IR x10                       Other:    Specific Instructions for next treatment: RTC protocol    Evaluation Complexity:  History (Personal factors, comorbidities) [] 0 [x] 1-2 [] 3+   Exam (limitations, restrictions) [x] 1-2 [] 3 [] 4+   Clinical presentation (progression) [x] Stable [] Evolving  [] Unstable   Decision Making [] Low [] Moderate [] High    [] Low Complexity [] Moderate Complexity [] High Complexity       Treatment Charges: Mins Units   [x] Evaluation       [x]  Low       []  Moderate       []  High 40 1   []  Modalities     [x]  Ther Exercise 15 1   []  Manual Therapy     []  Ther Activities     []  Aquatics     []  Vasocompression     []  Other       TOTAL TREATMENT TIME: 55min    Time in: 1p    Time Out: 2p     Electronically signed by: Fadia Stewart PT        Physician Signature:________________________________Date:__________________  By signing above or cosigning this note, I have reviewed this plan of care and certify a need for medically necessary rehabilitation services.      *PLEASE SIGN ABOVE AND FAX BACK ALL PAGES*

## 2020-09-21 NOTE — CONSULTS
Jay[facility] Fall Risk Assessment    Patient Name:  Keyana Joy  : 1965        Risk Factor Scale  Score   History of Falls [x] Yes  [] No 25  0 25   Secondary Diagnosis [x] Yes  [] No 15  0 15   Ambulatory Aid [] Furniture  [] Crutches/cane/walker  [x] None/bedrest/wheelchair/nurse 30  15  0 0   IV/Heparin Lock [] Yes  [x] No 20  0 0   Gait/Transferring [] Impaired  [] Weak  [x] Normal/bedrest/immobile 20  10  0 0   Mental Status [] Forgets limitations  [x] Oriented to own ability 15  0 0      Total:40     Based on the Assessment score: check the appropriate box.     []  No intervention needed   Low =   Score of 0-24    [x]  Use standard prevention interventions Moderate =  Score of 24-44   [x] Give patient handout and discuss fall prevention strategies   [x] Establish goal of education for patient/family RE: fall prevention strategies    []  Use high risk prevention interventions High = Score of 45 and higher   [] Give patient handout and discuss fall prevention strategies   [] Establish goal of education for patient/family Re: fall prevention strategies   [] Discuss lifeline / other resources    Electronically signed by:   Brendan Champion PT  Date: 2020

## 2020-09-22 ENCOUNTER — OFFICE VISIT (OUTPATIENT)
Dept: ORTHOPEDIC SURGERY | Age: 55
End: 2020-09-22

## 2020-09-22 VITALS
DIASTOLIC BLOOD PRESSURE: 89 MMHG | WEIGHT: 194 LBS | HEIGHT: 76 IN | SYSTOLIC BLOOD PRESSURE: 135 MMHG | BODY MASS INDEX: 23.62 KG/M2 | HEART RATE: 109 BPM

## 2020-09-22 PROCEDURE — 99024 POSTOP FOLLOW-UP VISIT: CPT | Performed by: PHYSICIAN ASSISTANT

## 2020-09-22 RX ORDER — OXYCODONE HYDROCHLORIDE AND ACETAMINOPHEN 5; 325 MG/1; MG/1
1 TABLET ORAL EVERY 4 HOURS PRN
Qty: 40 TABLET | Refills: 0 | Status: SHIPPED | OUTPATIENT
Start: 2020-09-22 | End: 2020-09-29

## 2020-09-22 ASSESSMENT — ENCOUNTER SYMPTOMS
CHEST TIGHTNESS: 0
RESPIRATORY NEGATIVE: 1
VOMITING: 0
APNEA: 0
COUGH: 0
ABDOMINAL PAIN: 0
SHORTNESS OF BREATH: 0
COLOR CHANGE: 0
DIARRHEA: 0
CONSTIPATION: 0
NAUSEA: 0
ABDOMINAL DISTENTION: 0

## 2020-09-22 NOTE — PROGRESS NOTES
[facility]  [address]  [facility] [address]  Dept: [phone]  Dept Fax: [phone]        Post Operative Follow Up    Subjective:     Chief Complaint   Patient presents with    Shoulder Pain     Left Shoulder       Post Op Surgery:     The patient is here for a follow up after having a Left shoulder arthroscopy with extensive debridement with Superior capsular reconstruction. The date of surgery was on 9/15/2020. Therefore we are 1 week post op. Currently the patient's pain is controlled with Percocet. Physical therapy was started. Patient presents today with a sling that is in good repair. Review of Systems   Constitutional: Positive for activity change. Negative for appetite change, fatigue and fever. Respiratory: Negative. Negative for apnea, cough, chest tightness and shortness of breath. Cardiovascular: Negative. Negative for chest pain, palpitations and leg swelling. Gastrointestinal: Negative for abdominal distention, abdominal pain, constipation, diarrhea, nausea and vomiting. Genitourinary: Negative for difficulty urinating, dysuria and hematuria. Musculoskeletal: Positive for arthralgias. Negative for gait problem, joint swelling and myalgias. Skin: Negative for color change and rash. Neurological: Negative for dizziness, weakness, numbness and headaches. Psychiatric/Behavioral: Positive for sleep disturbance. I have reviewed the CC, HPI, ROS, PMH, FHX, Social History, and if not present in this note, I have reviewed in the patient's chart. I agree with the documentation provided by other staff and have reviewed their documentation prior to providing my signature indicating agreement. Vitals:   /89   Pulse 109   Ht 6' 4\" (1.93 m)   Wt 194 lb (88 kg)   BMI 23.61 kg/m²  Body mass index is 23.61 kg/m².   Physical Examination:     Orthopedics:    GENERAL: Alert and oriented X3 in no acute distress. SKIN: Intact without lesions or ulcerations. Incisions are healing well with no signs of infection. Sutures removed. NEURO: Intact to sensory and motor testing. VASC: Capillary refill is less than 3 seconds. Post Op Exam:    LOCATION: Left shoulder  SITE: Distal neurocirculatory status is intact. EXAM: Sensation is intact to light touch, there is full motor function of the extremity. PALP: no pain to palpation  ROM: 90 degrees of forward elevation  Procedures:     Procedure:  No    Radiology:   SHOULDER X-RAY    Two views of the left shoulder and 2 views of the scapula, including AP, scapular Y, outlet and axillary views reveal cystic changes of the humeral head. Proximal migration of the humeral head has not occurred. Acromion is a type II. The acromioclavicular joint shows mild degenerative changes. Metallic anchor from previous surgery noted. Impression: Stable post op changes of the left shoulder    Assessment:     1. S/P arthroscopy of left shoulder    2. Traumatic complete tear of left rotator cuff, subsequent encounter      Plan:   Post Op Treatment : Patient had the treatment regimen reviewed today 1 week s/p Left shoulder arthroscopy with extensive debridement with Superior capsular reconstruction. I reviewed the films with the patient. We discussed some of the etiologies and natural histories of recovery after a Superior capsular reconstruction. We also discussed the various treatment alternatives including anti-inflammatory medications, physical therapy, injections, further imaging studies and as a last resort surgery. During today's visit, we discussed that he can wash incisions with soap and water. He can work with PT on passive ROM only. Patient should return to the office in 3 weeks to f/u with Madhavi Chamberlain D.O. . The patient will call the office immediately with any problems that may arise.      Orders Placed This Encounter   Medications    oxyCODONE-acetaminophen (PERCOCET) 5-325 MG per tablet     Sig: Take 1 tablet by mouth every 4 hours as needed for Pain for up to 7 days.      Dispense:  40 tablet     Refill:  0     Reduce doses taken as pain becomes manageable       Orders Placed This Encounter   Procedures    XR SHOULDER LEFT (MIN 2 VIEWS)     Standing Status:   Future     Number of Occurrences:   1     Standing Expiration Date:   9/22/2021     Order Specific Question:   Reason for exam:     Answer:   pain         Electronically signed by Darling Harris PA-C, on 9/22/2020 at 2:07 PM

## 2020-09-24 ENCOUNTER — HOSPITAL ENCOUNTER (OUTPATIENT)
Dept: PHYSICAL THERAPY | Facility: CLINIC | Age: 55
Setting detail: THERAPIES SERIES
Discharge: HOME | End: 2020-09-24
Payer: COMMERCIAL

## 2020-09-24 PROCEDURE — 97110 THERAPEUTIC EXERCISES: CPT

## 2020-09-24 PROCEDURE — 97016 VASOPNEUMATIC DEVICE THERAPY: CPT

## 2020-09-24 NOTE — FLOWSHEET NOTE
[] [address]  [address]  [phone]  F: [phone] [x] [address]  [facility] 36   [address]  P: [phone]  F: [phone] [] [place]  [address] [facility]  P: [phone]  F: [phone] [] [address]  [facility]   [address]   [place]: [phone]  F: [phone]      Physical Therapy Daily Treatment Note    Date:  2020  Patient Name:  Tari Chavarria    :  1965  MRN: [id number]  Physician: Kal Nixon D.O. Insurance: [facility] [place]  Medical Diagnosis: s/p Left shoulder superior capsule reconstruction; RC repair             Rehab Codes: L48.745, M25.612, [id number]  Onset Date: fall 20; surgery 9/15/20                 Next 's appt: 2020  Visit# / total visits: 2/16     Cancels/No Shows: 0/0    Subjective:    Pain:  [x] Yes  [] No Location: L shoulder Pain Rating: (0-10 scale) 5-6/10  Pain altered Tx:  [x] No  [] Yes  Action:  Comments: Pt reports his pain is okay right now. He reports he has most pain in the morning when he wakes up. Objective:  Modalities:  Game Ready to L shoulder min compression following PROM for 15'  Precautions: PROM only   Exercises:    Exercise Reps/ Time Weight/ Level Comments             Pendulum exercises 20xea   CW/CCW             PROM- flexion x30    *PROM 25' on                  elevation x20                    ER  x20                    ABD x20                    IR x10                                     Other:      Treatment Charges: Mins Units   []  Modalities     [x]  Ther Exercise 25 2   []  Manual Therapy     []  Ther Activities     []  Aquatics     [x]  Vasocompression 15 1   []  Other     Total Treatment time 40 3       Assessment: [x] Progressing toward goals.  Pt able to

## 2020-09-27 RX ORDER — LISINOPRIL 20 MG/1
TABLET ORAL
Qty: 90 TABLET | Refills: 3 | OUTPATIENT
Start: 2020-09-27

## 2020-09-27 RX ORDER — ATORVASTATIN CALCIUM 20 MG/1
TABLET, FILM COATED ORAL
Qty: 90 TABLET | Refills: 3 | OUTPATIENT
Start: 2020-09-27

## 2020-09-28 ENCOUNTER — HOSPITAL ENCOUNTER (OUTPATIENT)
Dept: PHYSICAL THERAPY | Facility: CLINIC | Age: 55
Setting detail: THERAPIES SERIES
Discharge: HOME | End: 2020-09-28
Payer: COMMERCIAL

## 2020-09-28 ENCOUNTER — OFFICE VISIT (OUTPATIENT)
Dept: FAMILY MEDICINE CLINIC | Age: 55
End: 2020-09-28
Payer: COMMERCIAL

## 2020-09-28 VITALS
WEIGHT: 193 LBS | SYSTOLIC BLOOD PRESSURE: 136 MMHG | TEMPERATURE: 98.4 F | DIASTOLIC BLOOD PRESSURE: 86 MMHG | BODY MASS INDEX: 23.5 KG/M2 | HEART RATE: 92 BPM | HEIGHT: 76 IN | OXYGEN SATURATION: 99 %

## 2020-09-28 PROCEDURE — G8420 CALC BMI NORM PARAMETERS: HCPCS | Performed by: PHYSICIAN ASSISTANT

## 2020-09-28 PROCEDURE — 4004F PT TOBACCO SCREEN RCVD TLK: CPT | Performed by: PHYSICIAN ASSISTANT

## 2020-09-28 PROCEDURE — 97016 VASOPNEUMATIC DEVICE THERAPY: CPT

## 2020-09-28 PROCEDURE — 3017F COLORECTAL CA SCREEN DOC REV: CPT | Performed by: PHYSICIAN ASSISTANT

## 2020-09-28 PROCEDURE — 99213 OFFICE O/P EST LOW 20 MIN: CPT | Performed by: PHYSICIAN ASSISTANT

## 2020-09-28 PROCEDURE — G8427 DOCREV CUR MEDS BY ELIG CLIN: HCPCS | Performed by: PHYSICIAN ASSISTANT

## 2020-09-28 PROCEDURE — 97110 THERAPEUTIC EXERCISES: CPT

## 2020-09-28 RX ORDER — ATORVASTATIN CALCIUM 20 MG/1
TABLET, FILM COATED ORAL
Qty: 90 TABLET | Refills: 1 | Status: SHIPPED | OUTPATIENT
Start: 2020-09-28 | End: 2021-01-09 | Stop reason: SDUPTHER

## 2020-09-28 RX ORDER — HYDROCHLOROTHIAZIDE 25 MG/1
25 TABLET ORAL EVERY MORNING
Qty: 90 TABLET | Refills: 1 | Status: SHIPPED | OUTPATIENT
Start: 2020-09-28 | End: 2021-01-09 | Stop reason: SDUPTHER

## 2020-09-28 RX ORDER — LISINOPRIL 20 MG/1
TABLET ORAL
Qty: 90 TABLET | Refills: 1 | Status: SHIPPED | OUTPATIENT
Start: 2020-09-28 | End: 2021-01-09 | Stop reason: SDUPTHER

## 2020-09-28 ASSESSMENT — ENCOUNTER SYMPTOMS
ABDOMINAL PAIN: 0
DIARRHEA: 0
COUGH: 0
VOMITING: 0
CONSTIPATION: 0
COLOR CHANGE: 0
SHORTNESS OF BREATH: 0
NAUSEA: 0
BLURRED VISION: 0

## 2020-09-28 ASSESSMENT — PATIENT HEALTH QUESTIONNAIRE - PHQ9
2. FEELING DOWN, DEPRESSED OR HOPELESS: 0
SUM OF ALL RESPONSES TO PHQ QUESTIONS 1-9: 0
SUM OF ALL RESPONSES TO PHQ9 QUESTIONS 1 & 2: 0
1. LITTLE INTEREST OR PLEASURE IN DOING THINGS: 0
SUM OF ALL RESPONSES TO PHQ QUESTIONS 1-9: 0

## 2020-09-28 NOTE — FLOWSHEET NOTE
[] [address]  [address]  [phone]  F: [phone] [x] [address] [facility] [address]   [address]  P: [phone]  F: [phone] [] [place]  [address] [facility]  P: [phone]  F: [phone] [] [address]  [facility]   [address]   [place]: [phone]  F: [phone]      Physical Therapy Daily Treatment Note    Date:  2020  Patient Name:  Earle Rodriguez    :  1965  MRN: [id number]  Physician: Robe Damon D.O. [place] [facility] [place]  Medical Diagnosis: s/p Left shoulder superior capsule reconstruction; RC repair             Rehab Codes: J36.526, M25.612, [id number]  Onset Date: fall 20; surgery 9/15/20                 Next 's appt: 2020  Visit# / total visits: 2/16     Cancels/No Shows: 0/0    Subjective:    Pain:  [x] Yes  [] No Location: L shoulder Pain Rating: (0-10 scale) 5/10  Pain altered Tx:  [x] No  [] Yes  Action:  Comments: Patient states he was pretty sore after last session. Objective:  Modalities:  Game Ready to L shoulder min compression following PROM for 15'  Precautions: PROM only   Exercises:    Exercise Reps/ Time Weight/ Level Comments             Pendulum exercises 20xea   CW/CCW             PROM- flexion x30    *PROM 25' on                  elevation x20                    ER  x20                    ABD x20                    IR x10                                     Other:      Treatment Charges: Mins Units   []  Modalities     [x]  Ther Exercise 30 2   []  Manual Therapy     []  Ther Activities     []  Aquatics     [x]  Vasocompression 15 1   []  Other     Total Treatment time 45 3       Assessment: [x] Progressing toward goals.   Patient has difficulties relaxing during PROM and therapist could feel twitching of the hand. Patient tolerated exercises well today no locking but difficulties moving arm through range smoothly due to resistance. Patient reports his shoulder feeling better after session today. [] No change. [] Other:  [x] Patient would continue to benefit from skilled physical therapy services in order to: decrease pain and increase ROM and strength in L shoulder.        STG: (to be met in 8 treatments)  1. ? Pain: reduce Left shoulder pain to 3/10 for ADL's  2. ? ROM: AAROM to 120° elevation when cleared per Dr Berna Magallanes  3. ? Strength: initiate light strengthening when cleared per Dr Berna Magallanes  4. ? Function: improve UEFI score for dressing to no difficulty  5. Patient to be independent with home exercise program as demonstrated by performance with correct form without cues. 6. Demonstrate Knowledge of fall prevention  LTG: (to be met in 16 treatments)  1. When cleared for AROM, Pt will achieve 120° seated elevation with good SH rhythm   2. ER strength to 4-/5  3. Improved sleep- improve UEFI score for sleeping to little difficulty      Pt. Education:  [x] Yes  [] No  [] Reviewed Prior HEP/Ed  Method of Education: [x] Verbal  [] Demo  [] Written  Comprehension of Education:  [x] Verbalizes understanding. [] Demonstrates understanding. [] Needs review. [] Demonstrates/verbalizes HEP/Ed previously given. Plan: [x] Continue current frequency toward long and short term goals. [] Specific Instructions for subsequent treatments: continue with PROM.        Time In: 1:00 PM            Time Out: 1:47 PM    Electronically signed by:  Cherry Shen PTA

## 2020-09-28 NOTE — PROGRESS NOTES
[address] [facility]  [address] [place] [address]  Dept: [phone]  Dept Fax: [phone]    Gena Walker is a 63 y.o. male who presents today for his medical conditions/complaintsas noted below. Gena Walker is c/o of   Chief Complaint   Patient presents with    Hypertension    Hyperlipidemia         HPI:     Hypertension   This is a chronic problem. The current episode started more than 1 year ago. The problem is unchanged. The problem is controlled. Pertinent negatives include no anxiety, blurred vision, chest pain, headaches, peripheral edema or shortness of breath. Risk factors for coronary artery disease include family history, male gender and sedentary lifestyle. Past treatments include ACE inhibitors. The current treatment provides moderate improvement. Compliance problems: less active presently as is healing from recent surgery L arm.    patient states needing refills. Feeling ok. Healing well from recent surgery. No results found for: LABA1C          ( goal A1Cis < 7)   No results found for: LABMICR  LDL Cholesterol (mg/dL)   Date Value   04/17/2018 98   09/15/2015 139 (H)   10/14/2014 101       (goal LDL is <100)   AST (U/L)   Date Value   07/16/2019 31     ALT (U/L)   Date Value   07/16/2019 31     BUN (mg/dL)   Date Value   09/09/2020 12     BP Readings from Last 3 Encounters:   09/28/20 136/86   09/22/20 135/89   09/15/20 138/76          (goal 120/80)    Past Medical History:   Diagnosis Date    Hyperlipidemia     Hypertension     TIA (transient ischemic attack)     Early 1990's    Wears dentures     Upper only at present time.  Wears glasses       Past Surgical History:   Procedure Laterality Date    EYE SURGERY      Removal of foreign object in Right eye in 1980's per pt.     ROTATOR CUFF REPAIR Left 2011    SHOULDER ARTHROSCOPY Left 9/15/2020    LEFT SHOULDER ARTHROSCOPY WITH ROTATOR CUFF REPAIR SUPERIOR CAPSULAR RECONSTRUCTION EXTENSIVE DEBRIDEMENT performed by Tera Jaquez DO at [address] History   Problem Relation Age of Onset    Alzheimer's Disease Mother     Hypertension Maternal Grandfather     Cancer Neg Hx        Social History     Tobacco Use    Smoking status: Current Every Day Smoker     Packs/day: 0.50     Years: 15.00     Pack years: 7.50     Types: Cigarettes    Smokeless tobacco: Never Used   Substance Use Topics    Alcohol use: Yes     Alcohol/week: 12.0 standard drinks     Types: 12 Cans of beer per week      Current Outpatient Medications   Medication Sig Dispense Refill    lisinopril (PRINIVIL;ZESTRIL) 20 MG tablet TAKE 1 TABLET BY MOUTH ONE TIME A DAY 90 tablet 1    atorvastatin (LIPITOR) 20 MG tablet TAKE 1 TABLET BY MOUTH ONE TIME A DAY 90 tablet 1    hydroCHLOROthiazide (HYDRODIURIL) 25 MG tablet Take 1 tablet by mouth every morning 90 tablet 1    oxyCODONE-acetaminophen (PERCOCET) 5-325 MG per tablet Take 1 tablet by mouth every 4 hours as needed for Pain for up to 7 days. 40 tablet 0    Omega-3 Fatty Acids (FISH OIL) 1000 MG CAPS Take 3,000 mg by mouth 3 times daily      ibuprofen (ADVIL;MOTRIN) 200 MG tablet Take 200 mg by mouth every 6 hours as needed for Pain       No current facility-administered medications for this visit.       No Known Allergies    Health Maintenance   Topic Date Due    Hepatitis C screen  1965    HIV screen  09/26/1979    DTaP/Tdap/Td vaccine (1 - Tdap) 09/26/1983    Shingles Vaccine (1 of 2) 09/26/2014    Lipid screen  07/16/2020    Flu vaccine (1) 09/01/2020    Colon cancer screen colonoscopy  09/17/2020    Potassium monitoring  09/09/2021    Creatinine monitoring  09/09/2021    Pneumococcal 0-64 years Vaccine  Completed    Hepatitis A vaccine  Aged Out    Hepatitis B vaccine  Aged Out    Hib vaccine  Aged Out    Meningococcal (ACWY) vaccine  Aged Out       Subjective:     Review of Systems   Constitutional: Negative for activity (around 10/12/2020) for hypertension. BP elevated here in office. Will add on HCTZ. Use and SE reviewed  Cont on ACE as well  Update labs, including psa and lipids  Encouraged healthy diet and exercising as able  Patient agreed with treatment plan  Recheck in 2-3 weeks to repeat BP    Orders Placed This Encounter   Procedures    Comprehensive Metabolic Panel     Standing Status:   Future     Standing Expiration Date:   9/28/2021    Lipid Panel     Standing Status:   Future     Standing Expiration Date:   9/28/2021     Order Specific Question:   Is Patient Fasting?/# of Hours     Answer:   yes    CBC Auto Differential     Standing Status:   Future     Standing Expiration Date:   9/28/2021    Psa screening     Standing Status:   Future     Standing Expiration Date:   9/28/2021     Orders Placed This Encounter   Medications    lisinopril (PRINIVIL;ZESTRIL) 20 MG tablet     Sig: TAKE 1 TABLET BY MOUTH ONE TIME A DAY     Dispense:  90 tablet     Refill:  1    atorvastatin (LIPITOR) 20 MG tablet     Sig: TAKE 1 TABLET BY MOUTH ONE TIME A DAY     Dispense:  90 tablet     Refill:  1    hydroCHLOROthiazide (HYDRODIURIL) 25 MG tablet     Sig: Take 1 tablet by mouth every morning     Dispense:  90 tablet     Refill:  1       Patient given educational materials - see patient instructions. Discussed use, benefit, and side effects of prescribed medications. All patientquestions answered. Pt voiced understanding. Reviewed health maintenance. Instructedto continue current medications, diet and exercise. Patient agreed with treatmentplan. Follow up as directed.      Electronically signed by Amanda Naik PA-C on 9/28/2020 at 11:54 AM

## 2020-09-28 NOTE — PROGRESS NOTES
Visit Information    Have you changed or started any medications since your last visit including any over-the-counter medicines, vitamins, or herbal medicines? no   Are you having any side effects from any of your medications? -  no  Have you stopped taking any of your medications? Is so, why? -  no    Have you seen any other physician or provider since your last visit? No  Have you had any other diagnostic tests since your last visit? No  Have you been seen in the emergency room and/or had an admission to a hospital since we last saw you? No  Have you had your routine dental cleaning in the past 6 months? no    Have you activated your [employer] account? If not, what are your barriers?  Yes     Patient Care Team:  Bernardo Ventura PA-C as PCP - General (Family Medicine)  Bernardo Vent[facility]-C [facility] P[facility]  Karie Foster DPM as Physician (Podiatry)    Medical History Review  Past Medical, Family, and Social History reviewed and does contribute to the patient presenting condition    Health Maintenance   Topic Date Due    Hepatitis C screen  1965    HIV screen  09/26/1979    DTaP/Tdap/Td vaccine (1 - Tdap) 09/26/1983    Shingles Vaccine (1 of 2) 09/26/2014    Lipid screen  07/16/2020    Flu vaccine (1) 09/01/2020    Colon cancer screen colonoscopy  09/17/2020    Potassium monitoring  09/09/2021    Creatinine monitoring  09/09/2021    Pneumococcal 0-64 years Vaccine  Completed    Hepatitis A vaccine  Aged Out    Hepatitis B vaccine  Aged Out    Hib vaccine  Aged Out    Meningococcal (ACWY) vaccine  Aged Out

## 2020-09-29 NOTE — OP NOTE
suite and placed in the supine position on the operative table. General inhalation anesthetic with endotracheal basin was performed. Exam under anesthesia revealed full passive range of motion with subacromial crepitation. He was placed in the lateral position with the affected shoulder up. Axillary roll was placed. He was padded under his bottom leg. He was padded between his knees. Beanbag was deflated. He was prepped and draped in normal sterile fashion. He was placed in 10 pounds of arthroscopic traction. All bony landmarks were marked with a marking pen. Posterior portal was established 2 cm distal 1 cm medial to the posterolateral aspect of the acromion. Diagnostic arthroscopy was undertaken from both the posterior and anterior portals. The humeral articular surface was intact. The glenoid articular surface was intact. The superior labrum was frayed as well as the anterior and posterior labrum. These were debrided back to stable border. The bicep tendon was noted to be subluxed medially and had a small articular cartilage indentation that it caused on the humeral head. Bisect tenotomy was performed. The anterior and posterior labrum were debrided back to stable border. There was a small split in the subscapularis from the biceps subluxation that was debrided back to stable border but did not require repair. There was a large tear involving the supraspinatus and infraspinatus was retracted to the glenoid. It was mobilized with intra-articularly and debrided back to stable border suture was removed from previous rotator cuff repair. Scope was then placed in the subacromial space lateral portal was established subacromial bursectomy and lysis of extensive adhesions was performed removal of any others remaining suture was performed. The rotator cuff appeared to be retracted back to the glenoid. There was just a small strip of tissue anteriorly and slightly more posteriorly.   After release which was initially repairable then became repairable becoming a complex rotator cuff repair over top of the capsular reconstruction. The debridement was extensive anteriorly posteriorly and superiorly due to scar tissue and suture from previous surgery including labral debridement, bicep tenotomy, and scar resection. The case took over 4 hours which is 50% longer than the standard rotator cuff repair. The portals were closed with 0 Vicryl 2-0 Vicryl 3-0 nylon suture. Sterile dressing was placed. DonJoy UltraSling was placed. Patient was awakened by department anesthesia and transferred to the postanesthesia care unit in stable condition.     Electronically signed by Javier Monge DO on 9/29/2020 at 5:43 PM

## 2020-09-30 ENCOUNTER — HOSPITAL ENCOUNTER (OUTPATIENT)
Dept: PHYSICAL THERAPY | Facility: CLINIC | Age: 55
Setting detail: THERAPIES SERIES
Discharge: HOME | End: 2020-09-30
Payer: COMMERCIAL

## 2020-09-30 PROCEDURE — 97110 THERAPEUTIC EXERCISES: CPT

## 2020-09-30 PROCEDURE — 97016 VASOPNEUMATIC DEVICE THERAPY: CPT

## 2020-09-30 NOTE — FLOWSHEET NOTE
[] [address]  [address]  [phone]  F: [phone] [x] [address]  [address] [facility]   [address]  P: [phone]  F: [phone] [] [address]  [address] [facility]  P: [phone]  F: [phone] [] [address]  [facility]   [address]   [place]: [phone]  F: [phone]      Physical Therapy Daily Treatment Note    Date:  2020  Patient Name:  Inge Rosario    :  1965  MRN: [id number]  Physician: Don Beal D.O. [place] [facility] [place]  Medical Diagnosis: s/p Left shoulder superior capsule reconstruction; RC repair             Rehab Codes: X63.771, M25.612, [id number]  Onset Date: fall 20; surgery 9/15/20                 Next 's appt: 2020  Visit# / total visits: 4/16     Cancels/No Shows: 0/0    Subjective:    Pain:  [x] Yes  [] No Location: L shoulder Pain Rating: (0-10 scale) 3/10  Pain altered Tx:  [x] No  [] Yes  Action:    Comments: Patient reports he is doing good, not much pain today. He felt good after  appt. Objective:  Modalities:  Game Ready to L shoulder min compression following PROM for 15'  Precautions: PROM only   Exercises:    Exercise Reps/ Time Weight/ Level Comments             Pendulum exercises 20xea   CW/CCW not today             PROM-L shoulder 30min    with inferior and posterior mobes                                                          Other:      Treatment Charges: Mins Units   []  Modalities     [x]  Ther Exercise 30 2   []  Manual Therapy     []  Ther Activities     []  Aquatics     [x]  Vasocompression 15 1   []  Other     Total Treatment time 45 3       Assessment: [x] Progressing toward goals. Cuing required for pt to reduce guarding again today.  Good ROM observed with PROM and little tightness noted in ER. Pt voices compliance with his HEP. [] No change. [] Other:  [x] Patient would continue to benefit from skilled physical therapy services in order to: decrease pain and increase ROM and strength in L shoulder.        STG: (to be met in 8 treatments)  1. ? Pain: reduce Left shoulder pain to 3/10 for ADL's  2. ? ROM: AAROM to 120° elevation when cleared per Dr Maverick Stockton  3. ? Strength: initiate light strengthening when cleared per Dr Maverick Stockton  4. ? Function: improve UEFI score for dressing to no difficulty  5. Patient to be independent with home exercise program as demonstrated by performance with correct form without cues. 6. Demonstrate Knowledge of fall prevention  LTG: (to be met in 16 treatments)  1. When cleared for AROM, Pt will achieve 120° seated elevation with good SH rhythm   2. ER strength to 4-/5  3. Improved sleep- improve UEFI score for sleeping to little difficulty      Pt. Education:  [x] Yes  [] No  [] Reviewed Prior HEP/Ed  Method of Education: [x] Verbal  [] Demo  [] Written  Comprehension of Education:  [x] Verbalizes understanding. [] Demonstrates understanding. [] Needs review. [x] Demonstrates/verbalizes HEP/Ed previously given. Plan: [x] Continue current frequency toward long and short term goals. [] Specific Instructions for subsequent treatments: continue with PROM.        Time In: 1:00 PM            Time Out: 1:45 PM    Electronically signed by:  Marcin Burroughs PTA

## 2020-10-02 ENCOUNTER — HOSPITAL ENCOUNTER (OUTPATIENT)
Dept: PHYSICAL THERAPY | Facility: CLINIC | Age: 55
Setting detail: THERAPIES SERIES
Discharge: HOME | End: 2020-10-02
Payer: COMMERCIAL

## 2020-10-02 PROCEDURE — 97110 THERAPEUTIC EXERCISES: CPT

## 2020-10-02 PROCEDURE — 97016 VASOPNEUMATIC DEVICE THERAPY: CPT

## 2020-10-02 NOTE — FLOWSHEET NOTE
[] [address]  [address]  [phone]  F: [phone] [x] [address] [facility] [address]   [address]  P: [phone]  F: [phone] [] [place]  [address] [facility]  P: [phone]  F: [phone] [] [address]  [facility]   [address]   [place]: [phone]  F: [phone]      Physical Therapy Daily Treatment Note    Date:  10/2/2020  Patient Name:  Octavia Carter    :  1965  MRN: [id number]  Physician: Kate Leung D.O. Insurance: [facility] [place]  Medical Diagnosis: s/p Left shoulder superior capsule reconstruction; RC repair             Rehab Codes: U84.363, M25.612, [id number]  Onset Date: fall 20; surgery 9/15/20                 Next 's appt: 2020  Visit# / total visits: 16     Cancels/No Shows: 0/0    Subjective:    Pain:  [x] Yes  [] No Location: L shoulder Pain Rating: (0-10 scale) 3/10  Pain altered Tx:  [x] No  [] Yes  Action:    Comments: Pt reports minimal pain. Did not wear sling to PT. Objective:  Modalities:  Game Ready to L shoulder min compression following PROM for 15'  Precautions: PROM only   Exercises:    Exercise Reps/ Time Weight/ Level Comments             Pendulum exercises 20xea   CW/CCW not 10/2/20, pt completed at home just prior to PT appt             PROM-L shoulder 25 min    with inferior and posterior mobes                                                          Other:      Treatment Charges: Mins Units   []  Modalities     [x]  Ther Exercise 25 2   []  Manual Therapy     []  Ther Activities     []  Aquatics     [x]  Vasocompression 15 1   []  Other     Total Treatment time 40 3       Assessment: [x] Progressing toward goals. Pt continues with guarding during PROM, despite multiple cues to relax.  Pt tolerates end range well. No increased pain throughout treatment. Ended with vasocompression. [] No change. [] Other:  [x] Patient would continue to benefit from skilled physical therapy services in order to: decrease pain and increase ROM and strength in L shoulder.        STG: (to be met in 8 treatments)  1. ? Pain: reduce Left shoulder pain to 3/10 for ADL's  2. ? ROM: AAROM to 120° elevation when cleared per Dr Jaden So  3. ? Strength: initiate light strengthening when cleared per Dr Jaden So  4. ? Function: improve UEFI score for dressing to no difficulty  5. Patient to be independent with home exercise program as demonstrated by performance with correct form without cues. 6. Demonstrate Knowledge of fall prevention  LTG: (to be met in 16 treatments)  1. When cleared for AROM, Pt will achieve 120° seated elevation with good SH rhythm   2. ER strength to 4-/5  3. Improved sleep- improve UEFI score for sleeping to little difficulty      Pt. Education:  [x] Yes  [] No  [x] Reviewed Prior HEP/Ed  Method of Education: [] Verbal  [] Demo  [] Written  Comprehension of Education:  [] Verbalizes understanding. [] Demonstrates understanding. [] Needs review. [x] Demonstrates/verbalizes HEP/Ed previously given. Pt reports compliance with pendulums at home. Plan: [x] Continue current frequency toward long and short term goals. [x] Specific Instructions for subsequent treatments: continue with PROM.        Time In: 10:18am              Time Out: 11:02am    Electronically signed by:  Gris Velazco PTA

## 2020-10-05 ENCOUNTER — HOSPITAL ENCOUNTER (OUTPATIENT)
Dept: PHYSICAL THERAPY | Facility: CLINIC | Age: 55
Setting detail: THERAPIES SERIES
Discharge: HOME | End: 2020-10-05
Payer: COMMERCIAL

## 2020-10-05 PROCEDURE — 97110 THERAPEUTIC EXERCISES: CPT

## 2020-10-05 PROCEDURE — 97016 VASOPNEUMATIC DEVICE THERAPY: CPT

## 2020-10-05 NOTE — FLOWSHEET NOTE
[] [address]  [address]  [phone]  F: [phone] [x] [address]  [facility]   [address]  P: [phone]  F: [phone] [] [place]  [address] [facility]  P: [phone]  F: [phone] [] [address]  [facility]   [address]   [place]: [phone]  F: [phone]      Physical Therapy Daily Treatment Note    Date:  10/5/2020  Patient Name:  Leena Reyes    :  1965  MRN: [id number]  Physician: Myriam Yarbrough D.O. Insurance: [facility] [place]  Medical Diagnosis: s/p Left shoulder superior capsule reconstruction; RC repair             Rehab Codes: M97.187, M25.612, [id number]  Onset Date: fall 20; surgery 9/15/20                 Next 's appt: 2020  Visit# / total visits:      Cancels/No Shows: 0/0    Subjective:    Pain:  [x] Yes  [] No Location: L shoulder Pain Rating: (0-10 scale) 2/10  Pain altered Tx:  [x] No  [] Yes  Action:    Comments: Pt reports feeling good today and reports his pain has been minimal.    Objective:  Modalities:  Game Ready to L shoulder min compression following PROM for 15'  Precautions: PROM only   Exercises:    Exercise Reps/ Time Weight/ Level Comments             Pendulum exercises 20xea   CW/CCW              PROM-L shoulder 25 min    with inferior and posterior mobes                                                          Other:      Treatment Charges: Mins Units   []  Modalities     [x]  Ther Exercise 30 2   []  Manual Therapy     []  Ther Activities     []  Aquatics     [x]  Vasocompression 15 1   []  Other     Total Treatment time 45 3       Assessment: [x] Progressing toward goals.  Patient tolerated treatment well however continues to have difficulties with being relaxed and allowing for passive ROM. Patient reports no troubles with HEP. [] No change. [] Other:  [x] Patient would continue to benefit from skilled physical therapy services in order to: decrease pain and increase ROM and strength in L shoulder.        STG: (to be met in 8 treatments)  1. ? Pain: reduce Left shoulder pain to 3/10 for ADL's  2. ? ROM: AAROM to 120° elevation when cleared per Dr Loren Franco  3. ? Strength: initiate light strengthening when cleared per Dr Loren Franco  4. ? Function: improve UEFI score for dressing to no difficulty  5. Patient to be independent with home exercise program as demonstrated by performance with correct form without cues. 6. Demonstrate Knowledge of fall prevention  LTG: (to be met in 16 treatments)  1. When cleared for AROM, Pt will achieve 120° seated elevation with good SH rhythm   2. ER strength to 4-/5  3. Improved sleep- improve UEFI score for sleeping to little difficulty      Pt. Education:  [x] Yes  [] No  [x] Reviewed Prior HEP/Ed  Method of Education: [] Verbal  [] Demo  [] Written  Comprehension of Education:  [] Verbalizes understanding. [] Demonstrates understanding. [] Needs review. [x] Demonstrates/verbalizes HEP/Ed previously given. Pt reports compliance with pendulums at home. Plan: [x] Continue current frequency toward long and short term goals. [x] Specific Instructions for subsequent treatments: continue with PROM.        Time In: 1:00 PM              Time Out: 1:45 PM    Electronically signed by:  Krysten Dalal PTA

## 2020-10-07 ENCOUNTER — HOSPITAL ENCOUNTER (OUTPATIENT)
Dept: PHYSICAL THERAPY | Facility: CLINIC | Age: 55
Setting detail: THERAPIES SERIES
Discharge: HOME | End: 2020-10-07
Payer: COMMERCIAL

## 2020-10-07 PROCEDURE — 97110 THERAPEUTIC EXERCISES: CPT

## 2020-10-07 PROCEDURE — 97016 VASOPNEUMATIC DEVICE THERAPY: CPT

## 2020-10-07 NOTE — FLOWSHEET NOTE
[] [address]  [address]  [phone]  F: [phone] [x] [address]  [facility] 36   [address]  P: [phone]  F: [phone] [] [place]  [address] [facility]  P: [phone]  F: [phone] [] [address]  [facility]   [address]   [place]: [phone]  F: [phone]      Physical Therapy Daily Treatment Note    Date:  10/7/2020  Patient Name:  Christine Live    :  1965  MRN: [id number]  Physician: Conchis Puri D.O. [place] [facility] [place]  Medical Diagnosis: s/p Left shoulder superior capsule reconstruction; RC repair             Rehab Codes: P76.673, M25.612, [id number]  Onset Date: fall 20; surgery 9/15/20                 Next 's appt: 10/15/2020  Visit# / total visits:      Cancels/No Shows: 0/0    Subjective:    Pain:  [x] Yes  [] No Location: L shoulder Pain Rating: (0-10 scale) 3/10  Pain altered Tx:  [x] No  [] Yes  Action:    Comments: Pt reports general stiffness in shoulder but no pain    Objective:  Modalities:  Game Ready to L shoulder min compression following PROM for 15'  Precautions: PROM only   Exercises:    Exercise Reps/ Time Weight/ Level Comments             Pendulum exercises 20xea   CW/CCW              PROM-L shoulder 25 min    Mobilization with movement (MWM) for ER (posterior glide)                                                         Other:3 wks post op on 10/06/2020      Treatment Charges: Mins Units   []  Modalities     [x]  Ther Exercise 31 2   []  Manual Therapy     []  Ther Activities     []  Aquatics     [x]  Vasocompression 15 1   []  Other     Total Treatment time 46 3       Assessment: [x] Progressing toward goals.  Passive ER was somewhat painful initially but pain reduced with posterior glide using MWM technique. Advised Pt to continue use of sling    [] No change. [] Other:  [x] Patient would continue to benefit from skilled physical therapy services in order to: decrease pain and increase ROM and strength in L shoulder.        STG: (to be met in 8 treatments)  1. ? Pain: reduce Left shoulder pain to 3/10 for ADL's  2. ? ROM: AAROM to 120° elevation when cleared per Dr Juany Wilson  3. ? Strength: initiate light strengthening when cleared per Dr Juany Wilson  4. ? Function: improve UEFI score for dressing to no difficulty  5. Patient to be independent with home exercise program as demonstrated by performance with correct form without cues. 6. Demonstrate Knowledge of fall prevention  LTG: (to be met in 16 treatments)  1. When cleared for AROM, Pt will achieve 120° seated elevation with good SH rhythm   2. ER strength to 4-/5  3. Improved sleep- improve UEFI score for sleeping to little difficulty      Pt. Education:  [x] Yes  [] No  [x] Reviewed Prior HEP/Ed  Method of Education: [] Verbal  [] Demo  [] Written  Comprehension of Education:   [] Verbalizes understanding. [] Demonstrates understanding. [] Needs review. [x] Demonstrates/verbalizes HEP/Ed previously given. Pt reports compliance with pendulums at home. Plan: [x] Continue current frequency toward long and short term goals. [x] Specific Instructions for subsequent treatments: continue with PROM.        Time In: 1:00 PM              Time Out: 1:48 PM    Electronically signed by:  Liang Garcia PT

## 2020-10-09 ENCOUNTER — HOSPITAL ENCOUNTER (OUTPATIENT)
Dept: PHYSICAL THERAPY | Facility: CLINIC | Age: 55
Setting detail: THERAPIES SERIES
Discharge: HOME | End: 2020-10-09
Payer: COMMERCIAL

## 2020-10-09 PROCEDURE — 97016 VASOPNEUMATIC DEVICE THERAPY: CPT

## 2020-10-09 PROCEDURE — 97110 THERAPEUTIC EXERCISES: CPT

## 2020-10-09 NOTE — FLOWSHEET NOTE
[] [address]  [address]  [phone]  F: [phone] [x] [address]  [facility] 36   [address]  P: [phone]  F: [phone] [] [place]  [address] [facility]  P: [phone]  F: [phone] [] [address]  [facility]   [address]   [place]: [phone]  F: [phone]      Physical Therapy Daily Treatment Note    Date:  10/9/2020  Patient Name:  Christine Live    :  1965  MRN: [id number]  Physician: Conchis Puri D.O. Insurance: [facility] [place]  Medical Diagnosis: s/p Left shoulder superior capsule reconstruction; RC repair             Rehab Codes: E01.677, M25.612, [id number]  Onset Date: fall 20; surgery 9/15/20                 Next 's appt: 10/15/2020  Visit# / total visits:      Cancels/No Shows: 0/0    Subjective:    Pain:  [x] Yes  [] No Location: L shoulder Pain Rating: (0-10 scale) 2-3/10  Pain altered Tx:  [x] No  [] Yes  Action:    Comments: Pt with low pain. Continues to go without sling. Pt states that he has been noticing his shoulder and elbow to \"pop\" or \"crack\" on occasion when at home.        Objective:  Modalities:  Game Ready to L shoulder min compression following PROM for 15'- 10' only 10/9/20 per pt request  Precautions: PROM only   Exercises:    Exercise Reps/ Time Weight/ Level Comments             Pendulum exercises HEP   CW/CCW              PROM-L shoulder 25 min    Mobilization with movement (MWM) for ER (posterior glide)                                                         Other:3 wks post op on 10/06/2020      Treatment Charges: Mins Units   []  Modalities     [x]  Ther Exercise 30 2   []  Manual Therapy     []  Ther Activities     []  Aquatics     [x]  Vasocompression 10 1   []  Other     Total Treatment time 40 3       Assessment: [x] Progressing toward goals. Good tolerance to PROM with improved relaxation. PROM measurements= 161 shoulder flexion, 167 abduction, 61 ER, 58 IR. No increased pain with or following treatment, some infrequent \"popping\" in shoulder with PROM. [] No change. [] Other:  [x] Patient would continue to benefit from skilled physical therapy services in order to: decrease pain and increase ROM and strength in L shoulder.        STG: (to be met in 8 treatments)  1. ? Pain: reduce Left shoulder pain to 3/10 for ADL's- met  2. ? ROM: AAROM to 120° elevation when cleared per Dr Dolores Mcwilliams- ongoing  3. ? Strength: initiate light strengthening when cleared per Dr Dolores Mcwilliams- ongoing  4. ? Function: improve UEFI score for dressing to no difficulty  5. Patient to be independent with home exercise program as demonstrated by performance with correct form without cues- met 10/9/20.  6. Demonstrate Knowledge of fall prevention- met, handout issued 10/9/20  LTG: (to be met in 16 treatments)  1. When cleared for AROM, Pt will achieve 120° seated elevation with good SH rhythm   2. ER strength to 4-/5  3. Improved sleep- improve UEFI score for sleeping to little difficulty      Pt. Education:  [x] Yes  [] No  [] Reviewed Prior HEP/Ed  Method of Education: [x] Verbal  [x] Demo  [x] Written- issued fall prevention handout  Comprehension of Education:   [x] Verbalizes understanding. [] Demonstrates understanding. [] Needs review. [] Demonstrates/verbalizes HEP/Ed previously given. Plan: [x] Continue current frequency toward long and short term goals. [x] Specific Instructions for subsequent treatments: continue with PROM.        Time In: 1:15pm              Time Out: 2:00pm    Electronically signed by:  Pj Bai PTA

## 2020-10-12 ENCOUNTER — HOSPITAL ENCOUNTER (OUTPATIENT)
Dept: PHYSICAL THERAPY | Facility: CLINIC | Age: 55
Setting detail: THERAPIES SERIES
Discharge: HOME | End: 2020-10-12
Payer: COMMERCIAL

## 2020-10-12 PROCEDURE — 97016 VASOPNEUMATIC DEVICE THERAPY: CPT

## 2020-10-12 PROCEDURE — 97110 THERAPEUTIC EXERCISES: CPT

## 2020-10-12 NOTE — FLOWSHEET NOTE
supine elevation 170°; ABDuction 160°; ER(at 45 abd) 60°; IR 90°; Patient would continue to benefit from skilled physical therapy services in order to: decrease pain and increase ROM and strength in L shoulder. [] No change. [x] Other:10/12/2020-Pt arrives without sling; we had previously discussed importance of wearing until cleared per Dr Jay Bradley  []      STG: (to be met in 8 treatments) recheck 10/12/20  1. ? Pain: reduce Left shoulder pain to 3/10 for ADL's- met  2. ? ROM: AAROM to 120° elevation when cleared per Dr Jay Bradley- passive elevation (supine) 170°  3. ? Strength: initiate light strengthening when cleared per Dr Jay Bradley- ongoing  4. ? Function: improve UEFI score for dressing to no difficulty  ongoing  5. Patient to be independent with home exercise program as demonstrated by performance with correct form without cues- met 10/9/20.  6. Demonstrate Knowledge of fall prevention- met, handout issued 10/9/20  LTG: (to be met in 16 treatments)  1. When cleared for AROM, Pt will achieve 120° seated elevation with good SH rhythm   2. ER strength to 4-/5  3. Improved sleep- improve UEFI score for sleeping to little difficulty      Pt. Education:  [x] Yes  [] No  [] Reviewed Prior HEP/Ed  Method of Education: [x] Verbal  [x] Demo  [x] Written- issued fall prevention handout  Comprehension of Education:   [x] Verbalizes understanding. [] Demonstrates understanding. [] Needs review. [] Demonstrates/verbalizes HEP/Ed previously given. Plan: [x] Continue current frequency toward long and short term goals. [x] Specific Instructions for subsequent treatments: continue with PROM.        Time In: 1pm              Time Out: 1:45pm    Electronically signed by:  Irene Johnson PT

## 2020-10-12 NOTE — PROGRESS NOTES
[] [facility]) [facility] &  Therapy  [address]  [phone]  F: [phone] [x] [address]  [employer] [address]   [address]  P: [phone]  F: [phone] [] [address] &  Therapy  [address]  P: [phone]  F: [phone] [] [address]  P: [phone]  F: [phone] [] [address]  [place]   [address]   [place]: [phone]  F: [phone]      Physical Therapy Progress Note    Date: 10/12/2020      Patient: Diya Mcwilliams  : 1965  MRN: [id number]    Tori Ward64, D. O.                             Insurance: [facility]  Medical Diagnosis: s/p Left shoulder superior capsule reconstruction; RC repair             Rehab Codes: M25.512, M25.612, M79.622  Onset Date: fall 20; surgery 9/15/20                 Next 's appt: 10/15/2020  Visit# / total visits:                                   Cancels/No Shows: 0/0      Subjective:    Pain:  [x]? Yes  []? No   Location: L shoulder   Pain Rating: (0-10 scale) 2/10  Pain altered Tx:  [x]? No  []?  Yes  Action:     Comments: Pt rates Left shoulder pain 2/10     Objective:  Test Measurements/Function: Jesus Jarrell has completed 9 physical therapy visits to date; we continue PROM all planes; (PROM) supine elevation 170°; ABDuction 160°; ER(at 45 abd) 60°; IR 90°; Patient would continue to benefit from skilled physical therapy services in order to: decrease pain and increase ROM and strength in L shoulder.        Assessment:     STG: (to be met in 8 treatments) recheck 10/12/20  1. ? Pain: reduce Left shoulder pain to 3/10 for ADL's- met  2. ? ROM: AAROM to 120° elevation when cleared per Dr Haroon Ramirez- passive elevation (supine) 170°  3. ? Strength: initiate light strengthening when

## 2020-10-14 ENCOUNTER — HOSPITAL ENCOUNTER (OUTPATIENT)
Dept: PHYSICAL THERAPY | Facility: CLINIC | Age: 55
Setting detail: THERAPIES SERIES
Discharge: HOME | End: 2020-10-14
Payer: COMMERCIAL

## 2020-10-14 PROCEDURE — 97110 THERAPEUTIC EXERCISES: CPT

## 2020-10-15 ENCOUNTER — OFFICE VISIT (OUTPATIENT)
Dept: ORTHOPEDIC SURGERY | Age: 55
End: 2020-10-15

## 2020-10-15 VITALS — BODY MASS INDEX: 24.11 KG/M2 | TEMPERATURE: 99.5 F | WEIGHT: 198 LBS | HEIGHT: 76 IN

## 2020-10-15 PROCEDURE — 99024 POSTOP FOLLOW-UP VISIT: CPT | Performed by: ORTHOPAEDIC SURGERY

## 2020-10-15 ASSESSMENT — ENCOUNTER SYMPTOMS
CONSTIPATION: 0
COLOR CHANGE: 0
COUGH: 0
APNEA: 0
CHEST TIGHTNESS: 0
ABDOMINAL DISTENTION: 0
VOMITING: 0
ABDOMINAL PAIN: 0
NAUSEA: 0
DIARRHEA: 0
SHORTNESS OF BREATH: 0

## 2020-10-15 NOTE — PROGRESS NOTES
Orthopedics:    GENERAL: Alert and oriented X3 in no acute distress. SKIN: Intact without lesions or ulcerations. Portal sites are well healed with no signs of infection. NEURO: Intact to sensory and motor testing. VASC: Capillary refill is less than 3 seconds. Post Op Exam:    LOCATION: Left Shoulder  SITE: Distal neurocirculatory status is intact. EXAM: Sensation is intact to light touch, there is full motor function of the extremity. ROM: passively 140 degrees of forward elevation. Procedures:     Procedure:  No    Radiology:   X-ray was reviewed from 09/22/2020. Assessment:     1. S/P arthroscopy of left shoulder    2. Traumatic complete tear of left rotator cuff, subsequent encounter      Plan:   Post Op Treatment : Patient had the treatment regimen reviewed today 4 weeks s/p Left shoulder arthroscopy with extensive debridement with Superior capsular reconstruction. I reviewed the films with the patient. We discussed some of the etiologies and natural histories of s/p Left shoulder arthroscopy. We also discussed the various treatment alternatives including anti-inflammatory medications, physical therapy, injections, further imaging studies and as a last resort surgery. During today's visit, I explained to the patient that it is imperative that he wears his sling because the shoulder needs to heal and if he is not wearing the sling, the healing process will be prolonged or the capsule may not heal at all. If that is the case, I informed the patient that he will need a total shoulder replacement and we are trying to avoid that. I also explained to him that it will take 4 months for the shoulder to heal and he needs to be careful because if the capsule does not heal then that's it for his shoulder. I believe he has been non compliant and actively elevating his shoulder. I fear he plans to continue to be non compliant, despite my warnings.  From there, I informed him that physical therapy, may do provided by other clinical staff. I reassessed the HPI and ROS as scribed by [employer] [employer] in my presence and it is both accurate and complete. Thereafter, I personally performed the PE, reviewed the imaging and established the DX and POC. I agree with the documentation provided by the Medical Scribe. I have reviewed all documentation in its entirety prior to providing my signature indicating agreement. Any areas of disagreement are noted on the chart.     Electronically signed by Winifred Baker DO on 10/17/2020 at 1:26 PM        Electronically signed by Winifred Baker DO, on 10/17/2020 at 1:26 PM

## 2020-10-15 NOTE — LETTER
[address] [place] [address] and Sports Medicine  [place] [address]  Phone: [phone]  Fax: DO Kate        October 15, 2020     Patient: Emilee Herndon   YOB: 1965   Date of Visit: 10/15/2020       To Whom It May Concern: It is my medical opinion that Argenis Moon may return to work on November 2, 2020 with the following restrictions: No Use of Left Arm, Must Wear Sling. .    If you have any questions or concerns, please don't hesitate to call.     Sincerely,      The Office of Javier Monge DO

## 2020-10-16 ENCOUNTER — APPOINTMENT (OUTPATIENT)
Dept: PHYSICAL THERAPY | Facility: CLINIC | Age: 55
End: 2020-10-16
Payer: COMMERCIAL

## 2020-10-19 ENCOUNTER — HOSPITAL ENCOUNTER (OUTPATIENT)
Dept: PHYSICAL THERAPY | Facility: CLINIC | Age: 55
Setting detail: THERAPIES SERIES
Discharge: HOME | End: 2020-10-19
Payer: COMMERCIAL

## 2020-10-19 PROCEDURE — 97110 THERAPEUTIC EXERCISES: CPT

## 2020-10-19 PROCEDURE — 97016 VASOPNEUMATIC DEVICE THERAPY: CPT

## 2020-10-19 NOTE — FLOWSHEET NOTE
[] [facility] [facility]  [address]  [phone]  F: [phone] [x] [address]  [facility] [address]   [address]  P: [phone]  F: [phone] [] [place]  [address] [facility] [address] [facility]  P: [phone]  F: [phone] [] [address]  [facility]   [address]   [place]: [phone]  F: [phone]      Physical Therapy Daily Treatment Note    Date:  10/19/2020  Patient Name:  Miguel Angel Thomas    :  1965  MRN: [id number]  Physician: Cornell Diaz D.O. Insurance: [facility]  Medical Diagnosis: s/p Left shoulder superior capsule reconstruction; RC repair             Rehab Codes: C34.578, M25.612, [id number]  Onset Date: fall 20; surgery 9/15/20                 Next 's appt: 2020  Visit# / total visits:     Cancels/No Shows: 0/0     Subjective:    Pain:  [x] Yes  [] No Location: L shoulder Pain Rating: (0-10 scale)  1-2/10  Pain altered Tx:  [x] No  [] Yes  Action:    Comments: Pt states Left shoulder is stiff but otherwise no complaints. He is scheduled to return to work with R UE use only on 2020. He arrives without sling despite his understanding that he should be wearing. States he did wear it to work meeting last Friday. Objective:  Modalities:  Game Ready to L shoulder min compression following PROM for 10'-   Precautions: PROM only;  No active ROM Left shoulder  Exercises:     Exercise Reps/ Time Weight/ Level Comments             Pendulum exercises X   CW/CCW - HEP             PROM-L shoulder 30    Mobilization with movement (MWM) for ER (posterior glide)         Shoulder shrugs x15     Scapular retraction x15            deltoid isometrics-gentle          flex  x15       ABD x15           Elbow/hand ROM x15  Elbow flex/ext           Other:5 wks post op on 10/20/2020      Treatment Charges: Mins Units   []  Modalities     [x]  Ther Exercise 35 2   []  Manual Therapy     []  Ther Activities     []  Aquatics     [x]  Vasocompression 10 1   []  Other     Total Treatment time 45 3       Assessment: [x] Progressing toward goals. Reiterated importance of wearing sling as Pt appears to continue to go AMA. Added shoulder shrugs, scapular retraction. Passive ER 50° with end range tightness. [] No change. [] Other:  []      STG: (to be met in 8 treatments) recheck 10/12/20  1. ? Pain: reduce Left shoulder pain to 3/10 for ADL's- met  2. ? ROM: AAROM to 120° elevation when cleared per Dr Leidy Almazan- passive elevation (supine) 170°  3. ? Strength: initiate light strengthening when cleared per Dr Leidy Almazan- ongoing  4. ? Function: improve UEFI score for dressing to no difficulty  ongoing  5. Patient to be independent with home exercise program as demonstrated by performance with correct form without cues- met 10/9/20.  6. Demonstrate Knowledge of fall prevention- met, handout issued 10/9/20  LTG: (to be met in 16 treatments)  1. When cleared for AROM, Pt will achieve 120° seated elevation with good SH rhythm   2. ER strength to 4-/5  3. Improved sleep- improve UEFI score for sleeping to little difficulty      Pt. Education:  [x] Yes  [] No  [] Reviewed Prior HEP/Ed  Method of Education: [x] Verbal  [x] Demo  [x] Written- issued fall prevention handout  Comprehension of Education:   [x] Verbalizes understanding. [] Demonstrates understanding. [] Needs review. [] Demonstrates/verbalizes HEP/Ed previously given. Plan: [x] Continue current frequency toward long and short term goals. [x] Specific Instructions for subsequent treatments: continue with PROM.        Time In: 1pm              Time Out: 1:50pm    Electronically signed by:  Karl Molina PT

## 2020-10-20 ENCOUNTER — OFFICE VISIT (OUTPATIENT)
Dept: FAMILY MEDICINE CLINIC | Age: 55
End: 2020-10-20
Payer: COMMERCIAL

## 2020-10-20 VITALS
HEART RATE: 104 BPM | OXYGEN SATURATION: 98 % | WEIGHT: 193 LBS | DIASTOLIC BLOOD PRESSURE: 78 MMHG | SYSTOLIC BLOOD PRESSURE: 124 MMHG | HEIGHT: 76 IN | BODY MASS INDEX: 23.5 KG/M2 | TEMPERATURE: 98 F

## 2020-10-20 PROCEDURE — 3017F COLORECTAL CA SCREEN DOC REV: CPT | Performed by: PHYSICIAN ASSISTANT

## 2020-10-20 PROCEDURE — G8484 FLU IMMUNIZE NO ADMIN: HCPCS | Performed by: PHYSICIAN ASSISTANT

## 2020-10-20 PROCEDURE — 99213 OFFICE O/P EST LOW 20 MIN: CPT | Performed by: PHYSICIAN ASSISTANT

## 2020-10-20 PROCEDURE — G8427 DOCREV CUR MEDS BY ELIG CLIN: HCPCS | Performed by: PHYSICIAN ASSISTANT

## 2020-10-20 PROCEDURE — G8420 CALC BMI NORM PARAMETERS: HCPCS | Performed by: PHYSICIAN ASSISTANT

## 2020-10-20 PROCEDURE — 4004F PT TOBACCO SCREEN RCVD TLK: CPT | Performed by: PHYSICIAN ASSISTANT

## 2020-10-20 ASSESSMENT — ENCOUNTER SYMPTOMS
CONSTIPATION: 0
COLOR CHANGE: 0
NAUSEA: 0
DIARRHEA: 0
BLURRED VISION: 0
VOMITING: 0
WHEEZING: 0
SHORTNESS OF BREATH: 0
COUGH: 0
ABDOMINAL PAIN: 0

## 2020-10-20 NOTE — PROGRESS NOTES
Visit Information    Have you changed or started any medications since your last visit including any over-the-counter medicines, vitamins, or herbal medicines? no   Are you having any side effects from any of your medications? -  no  Have you stopped taking any of your medications? Is so, why? -  no    Have you seen any other physician or provider since your last visit? No  Have you had any other diagnostic tests since your last visit? No  Have you been seen in the emergency room and/or had an admission to a hospital since we last saw you? No  Have you had your routine dental cleaning in the past 6 months? no    Have you activated your [employer] account? If not, what are your barriers?  Yes     Patient Care Team:  Hawa Pearson PA-C as PCP - General (Family Medicine)  Hawa Pearson PA-C as PCP [facility]  Elena Wild DPM as Physician (Podiatry)    Medical History Review  Past Medical, Family, and Social History reviewed and does contribute to the patient presenting condition    Health Maintenance   Topic Date Due    Hepatitis C screen  1965    HIV screen  09/26/1979    DTaP/Tdap/Td vaccine (1 - Tdap) 09/26/1983    Shingles Vaccine (1 of 2) 09/26/2014    Lipid screen  07/16/2020    Flu vaccine (1) 09/01/2020    Colon cancer screen colonoscopy  09/17/2020    Potassium monitoring  09/09/2021    Creatinine monitoring  09/09/2021    Pneumococcal 0-64 years Vaccine  Completed    Hepatitis A vaccine  Aged Out    Hepatitis B vaccine  Aged Out    Hib vaccine  Aged Out    Meningococcal (ACWY) vaccine  Aged Out

## 2020-10-20 NOTE — PROGRESS NOTES
[address] [facility]  [address] [address]  Dept: [phone]  Dept Fax: [phone]    Katarzyna Horvath is a 63 y.o. male who presents today for his medical conditions/complaintsas noted below. Katarzyna Horvath is c/o of   Chief Complaint   Patient presents with    Hypertension         HPI:     Hypertension   This is a chronic problem. The current episode started more than 1 year ago. The problem is unchanged. The problem is controlled. Pertinent negatives include no anxiety, blurred vision, chest pain, headaches, palpitations or shortness of breath. Risk factors for coronary artery disease include family history and male gender. Past treatments include ACE inhibitors and diuretics. The current treatment provides significant improvement. patient states finishing up PT after L shoulder repair. Getting ready to RTW soon. Has started smoking more since being off. Looking forward to RTW soon    No results found for: LABA1C          ( goal A1Cis < 7)   No results found for: LABMICR  LDL Cholesterol (mg/dL)   Date Value   04/17/2018 98   09/15/2015 139 (H)   10/14/2014 101       (goal LDL is <100)   AST (U/L)   Date Value   07/16/2019 31     ALT (U/L)   Date Value   07/16/2019 31     BUN (mg/dL)   Date Value   09/09/2020 12     BP Readings from Last 3 Encounters:   10/20/20 124/78   09/28/20 136/86   09/22/20 135/89          (goal 120/80)    Past Medical History:   Diagnosis Date    Hyperlipidemia     Hypertension     TIA (transient ischemic attack)     Early 1990's    Wears dentures     Upper only at present time.  Wears glasses       Past Surgical History:   Procedure Laterality Date    EYE SURGERY      Removal of foreign object in Right eye in 1980's per pt.     ROTATOR CUFF REPAIR Left 2011    SHOULDER ARTHROSCOPY Left 9/15/2020    LEFT SHOULDER ARTHROSCOPY WITH ROTATOR CUFF REPAIR SUPERIOR CAPSULAR RECONSTRUCTION EXTENSIVE DEBRIDEMENT performed by Gagandeep Browne DO at [address] [facility] History   Problem Relation Age of Onset    Alzheimer's Disease Mother     Hypertension Maternal Grandfather     Cancer Neg Hx        Social History     Tobacco Use    Smoking status: Current Every Day Smoker     Packs/day: 0.50     Years: 15.00     Pack years: 7.50     Types: Cigarettes    Smokeless tobacco: Never Used   Substance Use Topics    Alcohol use: Yes     Alcohol/week: 12.0 standard drinks     Types: 12 Cans of beer per week      Current Outpatient Medications   Medication Sig Dispense Refill    lisinopril (PRINIVIL;ZESTRIL) 20 MG tablet TAKE 1 TABLET BY MOUTH ONE TIME A DAY 90 tablet 1    atorvastatin (LIPITOR) 20 MG tablet TAKE 1 TABLET BY MOUTH ONE TIME A DAY 90 tablet 1    hydroCHLOROthiazide (HYDRODIURIL) 25 MG tablet Take 1 tablet by mouth every morning 90 tablet 1    Omega-3 Fatty Acids (FISH OIL) 1000 MG CAPS Take 3,000 mg by mouth 3 times daily       No current facility-administered medications for this visit. No Known Allergies    Health Maintenance   Topic Date Due    Hepatitis C screen  1965    HIV screen  09/26/1979    DTaP/Tdap/Td vaccine (1 - Tdap) 09/26/1983    Shingles Vaccine (1 of 2) 09/26/2014    Lipid screen  07/16/2020    Colon cancer screen colonoscopy  09/17/2020    Flu vaccine (1) 10/20/2021 (Originally 9/1/2020)    Potassium monitoring  09/09/2021    Creatinine monitoring  09/09/2021    Pneumococcal 0-64 years Vaccine  Completed    Hepatitis A vaccine  Aged Out    Hepatitis B vaccine  Aged Out    Hib vaccine  Aged Out    Meningococcal (ACWY) vaccine  Aged Out       Subjective:     Review of Systems   Constitutional: Negative for activity change, appetite change, fatigue, fever and unexpected weight change.         /78 (Site: Right Upper Arm, Position: Sitting, Cuff Size: Medium Adult)   Pulse 104   Temp 98 °F (36.7 °C) (Tympanic)   Ht 6' 4\" (1.93 m)   Wt 193 lb (87.5 kg)   SpO2 98% BMI 23.49 kg/m²    Eyes: Negative for blurred vision. Respiratory: Negative for cough, shortness of breath and wheezing. Cardiovascular: Negative for chest pain, palpitations and leg swelling. Gastrointestinal: Negative for abdominal pain, constipation, diarrhea, nausea and vomiting. Skin: Negative for color change, pallor, rash and wound. Neurological: Negative for weakness and headaches. Hematological: Negative for adenopathy. Psychiatric/Behavioral: The patient is not nervous/anxious. Objective:     Physical Exam  Vitals signs and nursing note reviewed. Constitutional:       Appearance: Normal appearance. He is well-developed. He is not ill-appearing. HENT:      Head: Normocephalic and atraumatic. Cardiovascular:      Rate and Rhythm: Normal rate and regular rhythm. Heart sounds: No murmur. Pulmonary:      Effort: Pulmonary effort is normal. No respiratory distress. Breath sounds: Normal breath sounds. No wheezing, rhonchi or rales. Musculoskeletal:      Right lower leg: No edema. Left lower leg: No edema. Skin:     General: Skin is warm and dry. Coloration: Skin is not pale. Findings: No erythema or rash. Neurological:      Mental Status: He is alert and oriented to person, place, and time. Psychiatric:         Behavior: Behavior normal.         Thought Content: Thought content normal.         Judgment: Judgment normal.       /78 (Site: Right Upper Arm, Position: Sitting, Cuff Size: Medium Adult)   Pulse 104   Temp 98 °F (36.7 °C) (Tympanic)   Ht 6' 4\" (1.93 m)   Wt 193 lb (87.5 kg)   SpO2 98%   BMI 23.49 kg/m²     Assessment:       Diagnosis Orders   1. Essential hypertension     2. Colon cancer screening  LIALA - Karla Wilkins DO, Gastroenterology, [facility]:      Return in about 6 months (around 4/20/2021) for hypertension.     Reprinted lab order, patient will go in near future  Await results  Cont present medications  Patient declined flu shot  GI screening colonoscopy referral given, patient will call to schedule  Stressed need for smoking cessation, patient declined assistance at this time  Recheck in 6 mo sooner prn    Orders Placed This Encounter   Procedures    LAILA - Mira Ahumada, DO, Gastroenterology, [facility]     Referral Priority:   Routine     Referral Type:   Eval and Treat     Referral Reason:   Specialty Services Required     Referred to Provider:   Renee Zavala DO     Requested Specialty:   Gastroenterology     Number of Visits Requested:   1       Patient given educational materials - see patient instructions. Discussed use, benefit, and side effects of prescribed medications. All patientquestions answered. Pt voiced understanding. Reviewed health maintenance. Instructedto continue current medications, diet and exercise. Patient agreed with treatmentplan. Follow up as directed.      Electronically signed by Mary Jimenez PA-C on 10/20/2020 at 1:17 PM

## 2020-10-21 ENCOUNTER — HOSPITAL ENCOUNTER (OUTPATIENT)
Dept: PHYSICAL THERAPY | Facility: CLINIC | Age: 55
Setting detail: THERAPIES SERIES
Discharge: HOME | End: 2020-10-21
Payer: COMMERCIAL

## 2020-10-21 PROCEDURE — 97110 THERAPEUTIC EXERCISES: CPT

## 2020-10-21 NOTE — FLOWSHEET NOTE
[] [facility]  [address]  [phone]  F: [phone] [x] [address] [facility] [address]   [address]  P: [phone]  F: [phone] [] [place]  [address] [facility]  P: [phone]  F: [phone] [] [address]  [facility]   [address]   [place]: [phone]  F: [phone]      Physical Therapy Daily Treatment Note    Date:  10/21/2020  Patient Name:  Guillermo Coats    :  1965  MRN: [id number]  Physician: Nicolasa Morejon D.O. Insurance: [facility]  Medical Diagnosis: s/p Left shoulder superior capsule reconstruction; RC repair             Rehab Codes: V62.607, M25.612, [id number]  Onset Date: fall 20; surgery 9/15/20                 Next 's appt: 2020  Visit# / total visits: (updated POC signed 10/12/20)     Cancels/No Shows: 0/0     Subjective:    Pain:  [x] Yes  [] No Location: L shoulder Pain Rating: (0-10 scale)  1-2/10  Pain altered Tx:  [x] No  [] Yes  Action:    Comments: Pt arrives wearing sling/swathe; he states he's had some pulsating in posterior shoulder since wearing sling. Objective:  Modalities:  Game Ready to L shoulder min compression following PROM for 10'- HELD 10/21   Precautions: PROM only;  No active ROM Left shoulder  Exercises:     Exercise Reps/ Time Weight/ Level Comments             Pendulum exercises X   CW/CCW - HEP             PROM-L shoulder 25    Mobilization with movement (MWM) for ER (posterior glide)         Shoulder shrugs x15     Scapular retraction x15           deltoid isometrics-gentle          flex x15       ABD x15           Elbow/hand ROM x15  Elbow flex/ext                               Other:5 wks post op on 10/20/2020      Treatment Charges: Mins Units   []  Modalities     [x]  Ther Exercise 35 2   []  Manual Therapy     []  Ther Activities     []  Aquatics     []  Vasocompression     []  Other     Total Treatment time 35 2       Assessment: [x] Progressing toward goals. Mona  notes he is no longer taking percocet (just taking tylenol); he still has tightness at end range ER; he also had some discomfort with light isometric (flexion/anterior deltoid) so held at 15 reps; Pt declined need for Cold post Ex but is encouraged to apply cold this evening if needed     [] No change. [] Other:       STG: (to be met in 8 treatments) recheck 10/12/20  1. ? Pain: reduce Left shoulder pain to 3/10 for ADL's- met  2. ? ROM: AAROM to 120° elevation when cleared per Dr Fanny Perez- passive elevation (supine) 170°  3. ? Strength: initiate light strengthening when cleared per Dr Fanny Perez- ongoing  4. ? Function: improve UEFI score for dressing to no difficulty  ongoing  5. Patient to be independent with home exercise program as demonstrated by performance with correct form without cues- met 10/9/20.  6. Demonstrate Knowledge of fall prevention- met, handout issued 10/9/20  LTG: (to be met in 16 treatments)  1. When cleared for AROM, Pt will achieve 120° seated elevation with good SH rhythm   2. ER strength to 4-/5  3. Improved sleep- improve UEFI score for sleeping to little difficulty      Pt. Education:  [x] Yes  [] No  [] Reviewed Prior HEP/Ed  Method of Education: [x] Verbal  [x] Demo  [x] Written- issued fall prevention handout  Comprehension of Education:   [x] Verbalizes understanding. [] Demonstrates understanding. [] Needs review. [] Demonstrates/verbalizes HEP/Ed previously given. Plan: [x] Continue current frequency toward long and short term goals. [x] Specific Instructions for subsequent treatments: continue with PROM.        Time In:12:59 pm              Time Out: 1:45pm    Electronically signed by:  Adali Damon PT

## 2020-10-23 ENCOUNTER — HOSPITAL ENCOUNTER (OUTPATIENT)
Dept: PHYSICAL THERAPY | Facility: CLINIC | Age: 55
Setting detail: THERAPIES SERIES
Discharge: HOME | End: 2020-10-23
Payer: COMMERCIAL

## 2020-10-23 PROCEDURE — 97110 THERAPEUTIC EXERCISES: CPT

## 2020-10-23 NOTE — FLOWSHEET NOTE
[] [address]  [address]  [phone]  F: [phone] [x] [address]  [facility] 36   [address]  P: [phone]  F: [phone] [] [address]  [address] [facility]  P: [phone]  F: [phone] [] [address]  [facility]   [address]   [place]: [phone]  F: [phone]      Physical Therapy Daily Treatment Note    Date:  10/23/2020  Patient Name:  Christine Live    :  1965  MRN: [id number]  Physician: Conchis Puri D.O. Insurance: [facility]  Medical Diagnosis: s/p Left shoulder superior capsule reconstruction; RC repair             Rehab Codes: J82.433, M25.612, [id number]  Onset Date: fall 20; surgery 9/15/20                 Next 's appt: 2020  Visit# / total visits: (updated POC signed 10/12/20)     Cancels/No Shows: 0/0     Subjective:    Pain:  [x] Yes  [] No Location: L shoulder Pain Rating: (0-10 scale)  2/10  Pain altered Tx:  [x] No  [] Yes  Action:    Comments:  Pt reports he was sore following isometrics last session      Objective:  Modalities:  Game Ready to L shoulder min compression following PROM for 10'- HELD 10/23  Precautions: PROM only;  No active ROM Left shoulder  Exercises:     Exercise Reps/ Time Weight/ Level Comments             Pendulum exercises X   CW/CCW - HEP             PROM-L shoulder 25    Mobilization with movement (MWM) for ER (posterior glide)         Shoulder shrugs x15     Scapular retraction x15           deltoid isometrics-gentle          flex x15       ABD x15           Elbow/hand ROM x15  Elbow flex/ext                               Other:5 wks post op on 10/20/2020      Treatment Charges: Mins Units   []  Modalities     [x]  Ther Exercise 35 2   []  Manual Therapy     []  Ther Activities     []  Aquatics     []  Vasocompression     []  Other     Total Treatment time 35 2       Assessment: [x] Progressing toward goals. Pt had a good tolerance to treatment today. Pt still has some tightness with ER and abduction on PROM. Pt had an improved tolerance to isometrics and stated it was not as sore following. Pt declined ice at end of treatment and stated he would ice at home. [] No change. [] Other:       STG: (to be met in 8 treatments) recheck 10/12/20  1. ? Pain: reduce Left shoulder pain to 3/10 for ADL's- met  2. ? ROM: AAROM to 120° elevation when cleared per Dr Cristiane Bridges- passive elevation (supine) 170°  3. ? Strength: initiate light strengthening when cleared per Dr Cristiane Bridges- ongoing  4. ? Function: improve UEFI score for dressing to no difficulty  ongoing  5. Patient to be independent with home exercise program as demonstrated by performance with correct form without cues- met 10/9/20.  6. Demonstrate Knowledge of fall prevention- met, handout issued 10/9/20  LTG: (to be met in 16 treatments)  1. When cleared for AROM, Pt will achieve 120° seated elevation with good SH rhythm   2. ER strength to 4-/5  3. Improved sleep- improve UEFI score for sleeping to little difficulty      Pt. Education:  [x] Yes  [] No  [] Reviewed Prior HEP/Ed  Method of Education: [x] Verbal  [x] Demo  [x] Written- issued fall prevention handout  Comprehension of Education:   [x] Verbalizes understanding. [] Demonstrates understanding. [] Needs review. [] Demonstrates/verbalizes HEP/Ed previously given. Plan: [x] Continue current frequency toward long and short term goals. [x] Specific Instructions for subsequent treatments: continue with PROM.        Time In:1:15 pm              Time Out: 1:50pm    Electronically signed by:  Lindy Dubin, PTA

## 2020-10-27 ENCOUNTER — HOSPITAL ENCOUNTER (OUTPATIENT)
Dept: PHYSICAL THERAPY | Facility: CLINIC | Age: 55
Setting detail: THERAPIES SERIES
Discharge: HOME | End: 2020-10-27
Payer: COMMERCIAL

## 2020-10-27 PROCEDURE — 97110 THERAPEUTIC EXERCISES: CPT

## 2020-10-27 NOTE — FLOWSHEET NOTE
[] [address]  [address]  [phone]  F: [phone] [x] [address]  [facility] 36   [address]  P: [phone]  F: [phone] [] [place]  [address] [facility]  P: [phone]  F: [phone] [] [address]  [facility]   [address]   [place]: [phone]  F: [phone]      Physical Therapy Daily Treatment Note    Date:  10/27/2020  Patient Name:  Inge Rosario    :  1965  MRN: [id number]  Physician: Don Beal D.O. Insurance: [employer]  Medical Diagnosis: s/p Left shoulder superior capsule reconstruction; RC repair             Rehab Codes: G86.417, M25.612, [id number]  Onset Date: fall 20; surgery 9/15/20                 Next 's appt: 2020  Visit# / total visits: (updated POC signed 10/12/20)     Cancels/No Shows: 0/0     Subjective:    Pain:  [x] Yes  [] No Location: L shoulder Pain Rating: (0-10 scale)  1/10  Pain altered Tx:  [x] No  [] Yes  Action:    Comments:  Pt states he is doing good. Going into ER is the hardest and most painful. Objective:  Modalities:  Game Ready to L shoulder min compression following PROM for 10'- HELD 10/23  Precautions: PROM only;  No active ROM Left shoulder  Exercises:     Exercise Reps/ Time Weight/ Level Comments             Pendulum exercises X   CW/CCW - HEP             PROM-L shoulder 25    Mobilization with movement (MWM) for ER (posterior glide)         Shoulder shrugs x15     Scapular retraction x15           deltoid isometrics-gentle          flex x15       ABD x15     IR 15x     ER 15x     ext 15x           Elbow/hand ROM x15  Elbow flex/ext                               Other:6 wks post op on 10/27/2020      Treatment Charges: Mins Units   []  Modalities     [x]  Ther Exercise 35 2   []  Manual Therapy     []  Ther Activities     []  Aquatics     []  Vasocompression     []  Other     Total Treatment time 35 2       Assessment: [x] Progressing toward goals. Good tolerance to exercises again today. Added IR, ER, and ext to isometrics today with no issues. Pt again denied vaso compression today. [] No change. [] Other:       STG: (to be met in 8 treatments) recheck 10/12/20  1. ? Pain: reduce Left shoulder pain to 3/10 for ADL's- met  2. ? ROM: AAROM to 120° elevation when cleared per Dr Kaycee Alaniz- passive elevation (supine) 170°  3. ? Strength: initiate light strengthening when cleared per Dr Kaycee Alaniz- ongoing  4. ? Function: improve UEFI score for dressing to no difficulty  ongoing  5. Patient to be independent with home exercise program as demonstrated by performance with correct form without cues- met 10/9/20.  6. Demonstrate Knowledge of fall prevention- met, handout issued 10/9/20  LTG: (to be met in 16 treatments)  1. When cleared for AROM, Pt will achieve 120° seated elevation with good SH rhythm   2. ER strength to 4-/5  3. Improved sleep- improve UEFI score for sleeping to little difficulty      Pt. Education:  [x] Yes  [] No  [] Reviewed Prior HEP/Ed  Method of Education: [x] Verbal  [x] Demo new exercises   [] Written-  Comprehension of Education:   [x] Verbalizes understanding. [] Demonstrates understanding. [] Needs review. [] Demonstrates/verbalizes HEP/Ed previously given. Plan: [x] Continue current frequency toward long and short term goals. [x] Specific Instructions for subsequent treatments: continue with PROM.        Time In:1:00 pm              Time Out: 1:40pm    Electronically signed by:  Mariana Valdivia PTA

## 2020-10-30 ENCOUNTER — HOSPITAL ENCOUNTER (OUTPATIENT)
Dept: PHYSICAL THERAPY | Facility: CLINIC | Age: 55
Setting detail: THERAPIES SERIES
Discharge: HOME | End: 2020-10-30
Payer: COMMERCIAL

## 2020-10-30 PROCEDURE — 97016 VASOPNEUMATIC DEVICE THERAPY: CPT

## 2020-10-30 PROCEDURE — 97110 THERAPEUTIC EXERCISES: CPT

## 2020-10-30 NOTE — FLOWSHEET NOTE
Therapy     []  Ther Activities     []  Aquatics     [x]  Vasocompression 10 1   []  Other     Total Treatment time 45 3       Assessment: [x] Progressing toward goals. Pt required cuing to relax during PROM as he presents very guarded. Increased discomfort reported at end range all planes, most prominent with ER. All exercises completed without pain, but pt requires frequent cuing to complete exercises with proper technique. Gave vaso x 10 minutes to end session this date. [] No change. [] Other:       STG: (to be met in 8 treatments) recheck 10/12/20  1. ? Pain: reduce Left shoulder pain to 3/10 for ADL's- met  2. ? ROM: AAROM to 120° elevation when cleared per Dr Kelsy Coleman- passive elevation (supine) 170°  3. ? Strength: initiate light strengthening when cleared per Dr Kelsy Coleman- ongoing  4. ? Function: improve UEFI score for dressing to no difficulty  ongoing  5. Patient to be independent with home exercise program as demonstrated by performance with correct form without cues- met 10/9/20.  6. Demonstrate Knowledge of fall prevention- met, handout issued 10/9/20  LTG: (to be met in 16 treatments)  1. When cleared for AROM, Pt will achieve 120° seated elevation with good SH rhythm   2. ER strength to 4-/5  3. Improved sleep- improve UEFI score for sleeping to little difficulty      Pt. Education:  [] Yes  [x] No  [] Reviewed Prior HEP/Ed  Method of Education: [] Verbal  [] Demo new exercises   [] Written-  Comprehension of Education:   [] Verbalizes understanding. [] Demonstrates understanding. [x] Needs review. [] Demonstrates/verbalizes HEP/Ed previously given. Plan: [x] Continue current frequency toward long and short term goals. [x] Specific Instructions for subsequent treatments: continue with PROM.        Time In:1:20pm              Time Out: 2:05pm    Electronically signed by:  Robert Beckford PTA

## 2020-11-02 ENCOUNTER — HOSPITAL ENCOUNTER (OUTPATIENT)
Dept: PHYSICAL THERAPY | Facility: CLINIC | Age: 55
Setting detail: THERAPIES SERIES
Discharge: HOME | End: 2020-11-02
Payer: COMMERCIAL

## 2020-11-02 NOTE — FLOWSHEET NOTE
[] [facility]) [facility]  [facility]  [address]    [phone]  F: [phone]   [x] [address] [facility] 36   [address]  P: [phone]  F: [phone]  [] [place]  [address] [facility]  P: [phone]  F: [phone] [] [address] [place] [address]  P: [phone]  F: [phone]  [] [address] [facility]   [address]   [place]: [phone]  F: [phone]   [] [place] [address] [facility] [address]  [place]: [phone]   F: [phone]     Physical Therapy Cancel/No Show note    Date: 2020  Patient: Nora Fernandez  : 1965  MRN: [id number]    Cancels/No Shows to date:     For today's appointment patient:    [x]  Cancelled    [] Rescheduled appointment    [] No-show     Reason given by patient:    []  Patient ill    []  Conflicting appointment    [] No transportation      [] Conflict with work    [] No reason given    [] Weather related    [] [id number]    [x] Other:      Comments: Went back to work today; in too much pain       [x] Next appointment was confirmed    Electronically signed by: Cleo Lopez, PT

## 2020-11-04 ENCOUNTER — HOSPITAL ENCOUNTER (OUTPATIENT)
Dept: PHYSICAL THERAPY | Facility: CLINIC | Age: 55
Setting detail: THERAPIES SERIES
Discharge: HOME | End: 2020-11-04
Payer: COMMERCIAL

## 2020-11-04 PROCEDURE — 97110 THERAPEUTIC EXERCISES: CPT

## 2020-11-04 NOTE — FLOWSHEET NOTE
[] [address]  [address]  [phone]  F: [phone] [x] [address]  [facility] 36   [address]  P: [phone]  F: [phone] [] [place]  1500 [facility]  P: [phone]  F: [phone] [] [address]  [facility]   [address]   [place]: [phone]  F: [phone]      Physical Therapy Daily Treatment Note    Date:  2020  Patient Name:  Marika Valadez    :  1965  MRN: [id number]  Physician: Drea Mcgrath D.O. Insurance: [facility]  Medical Diagnosis: s/p Left shoulder superior capsule reconstruction; RC repair             Rehab Codes: Z79.787, M25.612, [id number]  Onset Date: fall 20; surgery 9/15/20                 Next 's appt: 2020  Visit# / total visits: (updated POC signed 10/12/20)     Cancels/No Shows: 0/0     Subjective:    Pain:  [x] Yes  [] No Location: L shoulder Pain Rating: (0-10 scale)  3/10  Pain altered Tx:  [x] No  [] Yes  Action:    Comments:  Pt was very sore Monday (1st day back to work); today he rates his pain 3/10     Objective:  Modalities:  Game Ready to L shoulder min compression following PROM for 10'- Pt declined   Precautions: PROM only;  No active ROM Left shoulder  Exercises:     Exercise Reps/ Time Weight/ Level Comments             Pendulum exercises X   CW/CCW - HEP             PROM-L shoulder 23    Mobilization with movement (MWM) for ER (posterior glide)         Shoulder shrugs x15     Scapular retraction x15           deltoid isometrics-gentle          flex x15       ABD x15     IR 15x     ER 15x     ext 15x           Elbow/hand ROM x15  Elbow flex/ext                               Other:7 wks post op on 2020      Treatment Charges: Mins Units   []  Modalities     [x]

## 2020-11-09 ENCOUNTER — HOSPITAL ENCOUNTER (OUTPATIENT)
Dept: PHYSICAL THERAPY | Facility: CLINIC | Age: 55
Setting detail: THERAPIES SERIES
Discharge: HOME | End: 2020-11-09
Payer: COMMERCIAL

## 2020-11-09 PROCEDURE — 97016 VASOPNEUMATIC DEVICE THERAPY: CPT

## 2020-11-09 PROCEDURE — 97110 THERAPEUTIC EXERCISES: CPT

## 2020-11-09 NOTE — FLOWSHEET NOTE
[] [facility] [address] [facility]  [address]  [phone]  F: [phone] [x] [address] [facility] 36   [address]  P: [phone]  F: [phone] [] [place]  [address] [facility]  P: [phone]  F: [phone] [] [address]  [facility]   [address]   [place]: [phone]  F: [phone]      Physical Therapy Daily Treatment Note    Date:  2020  Patient Name:  Heber Mcgrath    :  1965  MRN: [id number]  Physician: Claude Solar, D.O. Insurance: [facility]  Medical Diagnosis: s/p Left shoulder superior capsule reconstruction; RC repair             Rehab Codes: D29.790, M25.612, [id number]  Onset Date: fall 20; surgery 9/15/20                 Next 's appt: 2020  Visit# / total visits: (updated POC signed 10/12/20)     Cancels/No Shows: 0/0     Subjective:    Pain:  [x] Yes  [] No Location: L shoulder Pain Rating: (0-10 scale)  4/10  Pain altered Tx:  [x] No  [] Yes  Action:    Comments:  Pt had some increased soreness in L shoulder today. Not sure if he slept weird on it last night or from being back at work. Objective:  Modalities:  Game Ready to L shoulder min compression following PROM for 10'  Precautions: PROM only;  No active ROM Left shoulder  Exercises:     Exercise Reps/ Time Weight/ Level Comments             Pendulum exercises X   CW/CCW - HEP             PROM-L shoulder 23    Mobilization with movement (MWM) for ER (posterior glide)         Shoulder shrugs x15     Scapular retraction x15           deltoid isometrics-gentle          flex x15       ABD x15     IR 15x     ER 15x     ext 15x           Elbow/hand ROM x15  Elbow flex/ext                               Other:7 wks post op on 2020      Treatment Charges: Mins

## 2020-11-18 ENCOUNTER — HOSPITAL ENCOUNTER (OUTPATIENT)
Dept: PHYSICAL THERAPY | Facility: CLINIC | Age: 55
Setting detail: THERAPIES SERIES
Discharge: HOME | End: 2020-11-18
Payer: COMMERCIAL

## 2020-11-18 PROCEDURE — 97110 THERAPEUTIC EXERCISES: CPT

## 2020-11-23 ENCOUNTER — OFFICE VISIT (OUTPATIENT)
Dept: ORTHOPEDIC SURGERY | Age: 55
End: 2020-11-23

## 2020-11-23 VITALS
HEIGHT: 76 IN | SYSTOLIC BLOOD PRESSURE: 158 MMHG | DIASTOLIC BLOOD PRESSURE: 92 MMHG | WEIGHT: 193 LBS | HEART RATE: 99 BPM | TEMPERATURE: 98.4 F | BODY MASS INDEX: 23.5 KG/M2

## 2020-11-23 PROCEDURE — 99024 POSTOP FOLLOW-UP VISIT: CPT | Performed by: ORTHOPAEDIC SURGERY

## 2020-11-23 ASSESSMENT — ENCOUNTER SYMPTOMS
APNEA: 0
COLOR CHANGE: 0
ABDOMINAL PAIN: 0
CHEST TIGHTNESS: 0
DIARRHEA: 0
SHORTNESS OF BREATH: 0
ABDOMINAL DISTENTION: 0
CONSTIPATION: 0
VOMITING: 0
COUGH: 0
NAUSEA: 0

## 2020-11-23 NOTE — PROGRESS NOTES
[facility]IC[facility]  [address]  [facility] [address]  Dept: [phone]  Dept Fax: [phone]        Post Operative Follow Up    Subjective:     Chief Complaint   Patient presents with    Shoulder Pain     Left shoulder scope DOS- 9/15/20       Post Op Surgery:     The patient is here for a follow up after having a Left shoulder arthroscopy with extensive debridement with Superior capsular reconstruction. The date of surgery was on 9/15/2020. Therefore we are 10 weeks post op. Currently the patient's pain is controlled with nothing. Physical therapy was started. Patient presents today with a sling that is in good repair. Review of Systems   Constitutional: Positive for activity change. Negative for appetite change. Respiratory: Negative for apnea, cough, chest tightness and shortness of breath. Cardiovascular: Negative for chest pain, palpitations and leg swelling. Gastrointestinal: Negative for abdominal distention, abdominal pain, constipation, diarrhea, nausea and vomiting. Genitourinary: Negative for difficulty urinating, dysuria and hematuria. Musculoskeletal: Positive for arthralgias. Negative for gait problem, joint swelling and myalgias. Skin: Negative for color change and rash. Neurological: Negative for dizziness, weakness, numbness and headaches. Psychiatric/Behavioral: Negative for sleep disturbance. I have reviewed the CC, HPI, ROS, PMH, FHX, Social History, and if not present in this note, I have reviewed in the patient's chart. I agree with the documentation provided by other staff and have reviewed their documentation prior to providing my signature indicating agreement. Vitals:   BP (!) 158/92   Pulse 99   Temp 98.4 °F (36.9 °C)   Ht 6' 4\" (1.93 m)   Wt 193 lb (87.5 kg)   BMI 23.49 kg/m²  Body mass index is 23.49 kg/m².   Physical Examination:     Orthopedics:    GENERAL: Alert and oriented X3 in no acute distress. SKIN: Intact without lesions or ulcerations. Incision is well healed with no sign of infection   NEURO: Intact to sensory and motor testing. VASC: Capillary refill is less than 3 seconds. Post Op Exam:    LOCATION: left shoulder  SITE: Distal neurocirculatory status is intact. EXAM: Sensation is intact to light touch, there is full motor function of the extremity. PALP: no tenderness  ROM: 140 degrees passively. Internal rotation to L1  Procedures:     Procedure:  No    Radiology:   No results found. Assessment:     1. S/P arthroscopy of left shoulder    2. Traumatic complete tear of left rotator cuff, subsequent encounter      Plan:   Post Op Treatment : Patient had the treatment regimen reviewed today 9 weeks and 6 days s/p Left shoulder arthroscopy with extensive debridement with Superior capsular reconstruction. I reviewed the films with the patient. We discussed some of the etiologies and natural histories of s/p left shoulder arthroscopy. We also discussed the various treatment alternatives including anti-inflammatory medications, physical therapy, injections, further imaging studies and as a last resort surgery. During today's visit, I explained to him that this will take a long time to heal especially because of the patch I put in there allowing me to repair the rotator cuff. I think at this point he can get rid of the pillow and just use the sling for comfort especially when the weather gets bad soon. I still want him to limit his lifting to no more than his hat, a spoon and a fork. This is imperative to allow for good healing of the rotator cuff. I think he should continue to work on his ROM with pulleys, slides and he can also do isometrics. If he is comfortable doing these exercises on his own he can stop therapy for the next month or 2 and go back to therapy once he is safe to start doing resistive exercises.   Patient should return to the office in 4 weeks to f/u with Jose Albarado PA-C for a motion check. The patient will call the office immediately with any problems that may arise. No orders of the defined types were placed in this encounter. No orders of the defined types were placed in this encounter. I, Olvin Sherman MS, AT, ATC, am scribing for and in the presence of Cornell CARLOS. 11/23/2020  5:49 PM    Electronically signed by Pia Whitaker DO, on 11/23/2020 at 5:49 PM       I, Cornell Diaz DO, have personally seen this patient and I have reviewed the CC, PMH, FHX and Social History as provided by other clinical staff. I reassessed the HPI and ROS as scribed by Olvin Sherman MS AT [facility] in my presence and it is both accurate and complete. Thereafter, I personally performed the PE, reviewed the imaging and established the DX and POC. I agree with the documentation provided by the . I have reviewed all documentation in its entirety prior to providing my signature indicating agreement. Any areas of disagreement are noted on the chart.     Electronically signed by Pia Whitaker DO on 11/23/2020 at 5:49 PM

## 2020-11-30 ENCOUNTER — HOSPITAL ENCOUNTER (OUTPATIENT)
Dept: PHYSICAL THERAPY | Facility: CLINIC | Age: 55
Setting detail: THERAPIES SERIES
Discharge: HOME | End: 2020-11-30
Payer: COMMERCIAL

## 2020-11-30 PROCEDURE — 97110 THERAPEUTIC EXERCISES: CPT

## 2020-11-30 NOTE — FLOWSHEET NOTE
[] [address]  [address]  [phone]  F: [phone] [x] [address]  [address] [facility]   [address]  P: [phone]  F: [phone] [] [place]  [address] [facility]  P: [phone]  F: [phone] [] [address]  [facility]   [place]: [phone]  F: [phone]      Physical Therapy Daily Treatment Note    Date:  2020  Patient Name:  Christine Live    :  1965  MRN: [id number]  Physician: Conchis Puri D.O. Insurance: [employer]  Medical Diagnosis: s/p Left shoulder superior capsule reconstruction; RC repair             Rehab Codes: X76.388, M25.612, [id number]  Onset Date: fall 20; surgery 9/15/20                 Next 's appt: 2020  Visit# / total visits: (updated POC signed 20)     Cancels/No Shows: 0/2     Subjective:    Pain:  [] Yes  [x] No Location: L shoulder Pain Rating: (0-10 scale)  0/10  Pain altered Tx:  [x] No  [] Yes  Action:    Comments:  Pt states he used a saw today and had no issues since he is RH dominant. Objective:  Modalities:    Precautions: PROM only;  No active ROM Left shoulder  Exercises:     Exercise Reps/ Time Weight/ Level Comments             Pendulum exercises X   CW/CCW - HEP             PROM-L shoulder     Mobilization with movement (MWM) for ER (posterior glide)         seated      Shoulder shrugs x15     Scapular retraction x15           deltoid isometrics-gentle          flex x15       ABD x15     IR 15x     ER 15x     ext 15x           Elbow/hand ROM x15  Elbow flex/ext                               Other: 9 wks post op on 2020      Treatment Charges: Mins Units   []  Modalities     [x]  Ther Exercise 29 2   []  Manual Therapy     []  Ther Activities     [] Aquatics     []  Vasocompression     []  Other     Total Treatment time 29 2       Assessment: [x] Progressing toward goals. Per ortho note of 11/23 we discussed holding PT until he is cleared to do additional resistive exercises; Reviewed light iso's; Pt also cleared to begin wall crawls    [] No change. [] Other:  STG: (to be met in 8 treatments) recheck 11/16  1. ? Pain: reduce Left shoulder pain to 3/10 for ADL's- met  2. ? ROM: AAROM to 120° elevation when cleared per Dr Sally Cordero- passive elevation (supine) 172°  3. ? Strength: initiate light strengthening when cleared per Dr Sally Cordero- ongoing  4. ? Function: improve UEFI score for dressing to no difficulty  ongoing  5. Patient to be independent with home exercise program as demonstrated by performance with correct form without cues- met 10/9/20.  6. Demonstrate Knowledge of fall prevention- met, handout issued 10/9/20  LTG: (to be met in 16 treatments)  1. When cleared for AROM, Pt will achieve 120° seated elevation with good SH rhythm   2. ER strength to 4-/5  3. Improved sleep- improve UEFI score for sleeping to little difficulty      Pt. Education:  [] Yes  [x] No  [] Reviewed Prior HEP/Ed  Method of Education: [] Verbal  [] Demo new exercises   [] Written-  Comprehension of Education:   [] Verbalizes understanding. [] Demonstrates understanding. [x] Needs review. [] Demonstrates/verbalizes HEP/Ed previously given. Plan: [x] Continue current frequency toward long and short term goals. [x] Specific Instructions for subsequent treatments: continue with PROM.        Time In: 5:30p              Time Out: 5:59pm    Electronically signed by:  Hubert Novoa, PT

## 2020-12-02 ENCOUNTER — HOSPITAL ENCOUNTER (OUTPATIENT)
Dept: PHYSICAL THERAPY | Facility: CLINIC | Age: 55
Setting detail: THERAPIES SERIES
Discharge: HOME | End: 2020-12-02
Payer: COMMERCIAL

## 2020-12-02 PROCEDURE — 97110 THERAPEUTIC EXERCISES: CPT

## 2020-12-02 NOTE — FLOWSHEET NOTE
[] [address]  [address]  [phone]  F: [phone] [x] [address]  [facility]   [address]  P: [phone]  F: [phone] [] [place]  [address] [facility]  P: [phone]  F: [phone] [] [address]  [facility]   [address]   [place]: [phone]  F: [phone]      Physical Therapy Daily Treatment Note    Date:  2020  Patient Name:  Miguel Angel Thomas    :  1965  MRN: [id number]  Physician: Cornell Diaz D.O. [place]: [facility] [place]  Medical Diagnosis: s/p Left shoulder superior capsule reconstruction; RC repair             Rehab Codes: F76.716, M25.612, [id number]  Onset Date: fall 20; surgery 9/15/20                 Next 's appt: 2020  Visit# / total visits: (updated POC signed 20)     Cancels/No Shows: 0/2     Subjective:    Pain:  [] Yes  [x] No Location: L shoulder Pain Rating: (0-10 scale)  0/10  Pain altered Tx:  [x] No  [] Yes  Action:    Comments:  Pt denies any Left shoulder pain     Objective:  Modalities:    Precautions: PROM only;  No active ROM Left shoulder  Exercises:     Exercise Reps/ Time Weight/ Level Comments             Pendulum exercises X   CW/CCW - HEP             PROM-L shoulder     Mobilization with movement (MWM) for ER (posterior glide)         seated      Shoulder shrugs x15     Scapular retraction x15           deltoid isometrics-gentle          flex x15       ABD x15     IR 15x     ER 15x     ext 15x           Elbow/hand ROM x15  Elbow flex/ext                               Other: 11wks post op on 2020      Treatment Charges: Mins Units   []  Modalities     [x]  Ther Exercise 28 2   []  Manual Therapy     []  Ther Activities     []  Aquatics     []  Vasocompression     [] Other     Total Treatment time 28 2       Assessment: [x] Progressing toward goals. PROM 12/2: supine elevation 168° ER at (45° at ABD) 68°; IR 85°; reviewed iso's and wall crawls; instructed in AA ER with cane for home (light stretch only)     [] No change. [] Other:  STG: (to be met in 8 treatments) recheck 11/16  1. ? Pain: reduce Left shoulder pain to 3/10 for ADL's- met  2. ? ROM: AAROM to 120° elevation when cleared per Dr Isael Jc- passive elevation (supine) 172°  3. ? Strength: initiate light strengthening when cleared per Dr Isael Jc- ongoing  4. ? Function: improve UEFI score for dressing to no difficulty  ongoing  5. Patient to be independent with home exercise program as demonstrated by performance with correct form without cues- met 10/9/20.  6. Demonstrate Knowledge of fall prevention- met, handout issued 10/9/20  LTG: (to be met in 16 treatments)  1. When cleared for AROM, Pt will achieve 120° seated elevation with good SH rhythm   2. ER strength to 4-/5  3. Improved sleep- improve UEFI score for sleeping to little difficulty      Pt. Education:  [] Yes  [x] No  [] Reviewed Prior HEP/Ed  Method of Education: [] Verbal  [] Demo new exercises   [] Written-  Comprehension of Education:   [] Verbalizes understanding. [] Demonstrates understanding. [x] Needs review. [] Demonstrates/verbalizes HEP/Ed previously given. Plan: [x] Continue current frequency toward long and short term goals.     [x] Specific Instructions for subsequent treatments: To HOLD PT pending follow up with ortho on 12/21 when cleared to do strengthening       Time In: 5:30p              Time Out: 5:58pm    Electronically signed by:  Edgar Merlos, PT

## 2020-12-03 ENCOUNTER — PATIENT MESSAGE (OUTPATIENT)
Dept: FAMILY MEDICINE CLINIC | Age: 55
End: 2020-12-03

## 2020-12-21 ENCOUNTER — OFFICE VISIT (OUTPATIENT)
Dept: ORTHOPEDIC SURGERY | Age: 55
End: 2020-12-21
Payer: COMMERCIAL

## 2020-12-21 VITALS
BODY MASS INDEX: 23.5 KG/M2 | WEIGHT: 193 LBS | HEART RATE: 95 BPM | DIASTOLIC BLOOD PRESSURE: 81 MMHG | SYSTOLIC BLOOD PRESSURE: 128 MMHG | TEMPERATURE: 98.2 F | HEIGHT: 76 IN

## 2020-12-21 PROCEDURE — 99213 OFFICE O/P EST LOW 20 MIN: CPT | Performed by: PHYSICIAN ASSISTANT

## 2020-12-21 PROCEDURE — G8420 CALC BMI NORM PARAMETERS: HCPCS | Performed by: PHYSICIAN ASSISTANT

## 2020-12-21 PROCEDURE — G8484 FLU IMMUNIZE NO ADMIN: HCPCS | Performed by: PHYSICIAN ASSISTANT

## 2020-12-21 PROCEDURE — 3017F COLORECTAL CA SCREEN DOC REV: CPT | Performed by: PHYSICIAN ASSISTANT

## 2020-12-21 PROCEDURE — 4004F PT TOBACCO SCREEN RCVD TLK: CPT | Performed by: PHYSICIAN ASSISTANT

## 2020-12-21 PROCEDURE — G8427 DOCREV CUR MEDS BY ELIG CLIN: HCPCS | Performed by: PHYSICIAN ASSISTANT

## 2020-12-21 NOTE — PROGRESS NOTES
[facility]  [address]  [facility] [address]  Dept: [phone]  Dept Fax: [phone]        Post Operative Follow Up    Subjective:     Chief Complaint   Patient presents with    Shoulder Pain     Left shoulder scope DOS- 9/15/20     Post Op Surgery:     The patient is here for a follow up after having a Left shoulder arthroscopy with extensive debridement with Superior capsular reconstruction. The date of surgery was on 9/15/2020. Therefore we are almost 14 weeks post op. Currently the patient's pain is controlled with nothing. Physical therapy was started. Patient presents today with a sling that is in good repair. Review of Systems    I have reviewed the CC, HPI, ROS, PMH, FHX, Social History, and if not present in this note, I have reviewed in the patient's chart. I agree with the documentation provided by other staff and have reviewed their documentation prior to providing my signature indicating agreement. Vitals:   /81   Pulse 95   Temp 98.2 °F (36.8 °C)   Ht 6' 4\" (1.93 m)   Wt 193 lb (87.5 kg)   BMI 23.49 kg/m²  Body mass index is 23.49 kg/m². Physical Examination:     Orthopedics:    GENERAL: Alert and oriented X3 in no acute distress. SKIN: Intact without lesions or ulcerations. Incision is well healed with no signs of infection. NEURO: Intact to sensory and motor testing. VASC: Capillary refill is less than 3 seconds. Post Op Exam:    LOCATION: Left shoulder  SITE: Distal neurocirculatory status is intact. EXAM: Sensation is intact to light touch, there is full motor function of the extremity. ROM: 140 degrees of forward elevation, 40 degrees of external rotation in neutral, 70 degrees of external rotation and abduction, internal rotation to L1  Procedures:     Procedure:  No    Radiology:   No results found. Assessment:     1.  S/P arthroscopy of left shoulder      Plan:   Post Op Treatment : Patient had the treatment regimen reviewed today almost 14 weeks s/p Left shoulder Arthroscopy ith extensive debridement with Superior capsular reconstruction. I reviewed the films with the patient. We discussed some of the etiologies and natural histories of recovery after a superior capsular reconstruction. We also discussed the various treatment alternatives including anti-inflammatory medications, physical therapy, injections, further imaging studies and as a last resort surgery. During today's visit, we discussed that the patient needs to be very careful and should not be lifting anything heavier than his hat or a fork and a spoon. Patient says he has been working any has to use that arm to help carry things. I told him that that is a dangerous thing to do because right now his shoulder is vulnerable. He states understanding but also says he has to work. The patient then stated that they understand the plan and at this time, the patient has opted resuming physical therapy in approximately 2 weeks. They will follow the superior capsular reconstruction protocol and he can begin deltoid strengthening. A Rx refill of was not given . Patient should return to the office in 6 weeks to f/u with Alexa Gonzalez D.CARMELO. The patient will call the office immediately with any problems that may arise. No orders of the defined types were placed in this encounter. Orders Placed This Encounter   Procedures   [address]     Referral Priority:   Routine     Referral Type:   Eval and Treat     Referral Reason:   Specialty Services Required     Requested Specialty:   Physical Therapy     Number of Visits Requested:   1       I, Deion Stroud PA-C, have personally seen this patient, reviewed the chart including history, and imaging if done. I personally  performed the physical exam and obtained any needed additional history.  I placed orders, performed or supervised procedures and developed the treatment plan.     Electronically signed by Jai Blakely PA-C, on 12/21/2020 at 2:54 PM      Electronically signed by Jai Blakely PA-C, on 12/21/2020 at 2:14 PM

## 2021-01-09 DIAGNOSIS — I10 ESSENTIAL HYPERTENSION: ICD-10-CM

## 2021-01-09 DIAGNOSIS — E78.2 MIXED HYPERLIPIDEMIA: ICD-10-CM

## 2021-01-10 RX ORDER — HYDROCHLOROTHIAZIDE 25 MG/1
25 TABLET ORAL EVERY MORNING
Qty: 90 TABLET | Refills: 1 | Status: SHIPPED | OUTPATIENT
Start: 2021-01-10 | End: 2021-06-16

## 2021-01-10 RX ORDER — ATORVASTATIN CALCIUM 20 MG/1
TABLET, FILM COATED ORAL
Qty: 90 TABLET | Refills: 1 | Status: SHIPPED | OUTPATIENT
Start: 2021-01-10 | End: 2021-06-22

## 2021-01-10 RX ORDER — LISINOPRIL 20 MG/1
TABLET ORAL
Qty: 90 TABLET | Refills: 1 | Status: SHIPPED | OUTPATIENT
Start: 2021-01-10 | End: 2021-06-22

## 2021-02-01 DIAGNOSIS — Z12.11 COLON CANCER SCREENING: ICD-10-CM

## 2021-04-20 ENCOUNTER — OFFICE VISIT (OUTPATIENT)
Dept: FAMILY MEDICINE CLINIC | Age: 56
End: 2021-04-20
Payer: COMMERCIAL

## 2021-04-20 VITALS
BODY MASS INDEX: 23.99 KG/M2 | DIASTOLIC BLOOD PRESSURE: 78 MMHG | WEIGHT: 197 LBS | TEMPERATURE: 97.7 F | OXYGEN SATURATION: 98 % | HEART RATE: 79 BPM | HEIGHT: 76 IN | SYSTOLIC BLOOD PRESSURE: 128 MMHG

## 2021-04-20 DIAGNOSIS — Z12.5 PROSTATE CANCER SCREENING: ICD-10-CM

## 2021-04-20 DIAGNOSIS — G56.01 CARPAL TUNNEL SYNDROME OF RIGHT WRIST: ICD-10-CM

## 2021-04-20 DIAGNOSIS — E78.5 HYPERLIPIDEMIA, UNSPECIFIED HYPERLIPIDEMIA TYPE: ICD-10-CM

## 2021-04-20 DIAGNOSIS — Z72.0 TOBACCO ABUSE: ICD-10-CM

## 2021-04-20 DIAGNOSIS — I10 ESSENTIAL HYPERTENSION: Primary | ICD-10-CM

## 2021-04-20 PROCEDURE — G8420 CALC BMI NORM PARAMETERS: HCPCS | Performed by: PHYSICIAN ASSISTANT

## 2021-04-20 PROCEDURE — 3017F COLORECTAL CA SCREEN DOC REV: CPT | Performed by: PHYSICIAN ASSISTANT

## 2021-04-20 PROCEDURE — G8427 DOCREV CUR MEDS BY ELIG CLIN: HCPCS | Performed by: PHYSICIAN ASSISTANT

## 2021-04-20 PROCEDURE — 99213 OFFICE O/P EST LOW 20 MIN: CPT | Performed by: PHYSICIAN ASSISTANT

## 2021-04-20 PROCEDURE — 4004F PT TOBACCO SCREEN RCVD TLK: CPT | Performed by: PHYSICIAN ASSISTANT

## 2021-04-20 ASSESSMENT — ENCOUNTER SYMPTOMS
NAUSEA: 0
DIARRHEA: 0
COUGH: 0
COLOR CHANGE: 0
VOMITING: 0
CONSTIPATION: 0
WHEEZING: 0
ABDOMINAL PAIN: 0
SHORTNESS OF BREATH: 0

## 2021-04-20 ASSESSMENT — PATIENT HEALTH QUESTIONNAIRE - PHQ9
SUM OF ALL RESPONSES TO PHQ QUESTIONS 1-9: 0
2. FEELING DOWN, DEPRESSED OR HOPELESS: 0
1. LITTLE INTEREST OR PLEASURE IN DOING THINGS: 0
SUM OF ALL RESPONSES TO PHQ QUESTIONS 1-9: 0
SUM OF ALL RESPONSES TO PHQ9 QUESTIONS 1 & 2: 0

## 2021-04-20 NOTE — PROGRESS NOTES
[address] [facility]  [address] [address]  Dept: [phone]  Dept Fax: [phone]    Shruti Chong is a 63 y.o. male who presents today for his medical conditions/complaintsas noted below. Shruti Chong is c/o of   Chief Complaint   Patient presents with    Hypertension         HPI:     Hypertension  This is a chronic problem. The current episode started more than 1 year ago. The problem is unchanged. The problem is controlled. Pertinent negatives include no chest pain, palpitations, peripheral edema or shortness of breath. Risk factors for coronary artery disease include dyslipidemia, male gender and sedentary lifestyle. Past treatments include ACE inhibitors and diuretics. The current treatment provides moderate improvement. patient doing well. He states working 6 days a week 10 hour shifts  Has been smoking more. Not presently interested in stopping. He is aware of risk of lung CA and COPD  He states has not had time to get labs done, needs updated order and will try to complete in near future    He has been having some pain in the right wrist and occasional tingling that seems worse at night. He does a lot of fine motions with his hands that are repetitive at work. He is not presently doing anything for treatment.  This is his dominant side    No results found for: LABA1C          ( goal A1Cis < 7)   No results found for: LABMICR  LDL Cholesterol (mg/dL)   Date Value   04/17/2018 98   09/15/2015 139 (H)   10/14/2014 101       (goal LDL is <100)   AST (U/L)   Date Value   07/16/2019 31     ALT (U/L)   Date Value   07/16/2019 31     BUN (mg/dL)   Date Value   09/09/2020 12     BP Readings from Last 3 Encounters:   04/20/21 128/78   12/21/20 128/81   11/23/20 (!) 158/92          (goal 120/80)    Past Medical History:   Diagnosis Date    Hyperlipidemia     Hypertension     TIA (transient ischemic attack)     Early 1990's    Wears dentures     Upper only at present time.  Wears glasses       Past Surgical History:   Procedure Laterality Date    EYE SURGERY      Removal of foreign object in Right eye in 1980's per pt.  ROTATOR CUFF REPAIR Left 2011    SHOULDER ARTHROSCOPY Left 9/15/2020    LEFT SHOULDER ARTHROSCOPY WITH ROTATOR CUFF REPAIR SUPERIOR CAPSULAR RECONSTRUCTION EXTENSIVE DEBRIDEMENT performed by Junior Davis DO at [address] History   Problem Relation Age of Onset    Alzheimer's Disease Mother     Hypertension Maternal Grandfather     Cancer Neg Hx        Social History     Tobacco Use    Smoking status: Current Every Day Smoker     Packs/day: 0.50     Years: 15.00     Pack years: 7.50     Types: Cigarettes    Smokeless tobacco: Never Used   Substance Use Topics    Alcohol use: Yes     Alcohol/week: 12.0 standard drinks     Types: 12 Cans of beer per week      Current Outpatient Medications   Medication Sig Dispense Refill    lisinopril (PRINIVIL;ZESTRIL) 20 MG tablet TAKE 1 TABLET BY MOUTH ONE TIME A DAY 90 tablet 1    atorvastatin (LIPITOR) 20 MG tablet TAKE 1 TABLET BY MOUTH ONE TIME A DAY 90 tablet 1    hydroCHLOROthiazide (HYDRODIURIL) 25 MG tablet Take 1 tablet by mouth every morning 90 tablet 1    Omega-3 Fatty Acids (FISH OIL) 1000 MG CAPS Take 3,000 mg by mouth 3 times daily       No current facility-administered medications for this visit.       No Known Allergies    Health Maintenance   Topic Date Due    Hepatitis C screen  Never done    HIV screen  Never done    COVID-19 Vaccine (1) Never done    DTaP/Tdap/Td vaccine (1 - Tdap) Never done    Shingles Vaccine (1 of 2) Never done    Lipid screen  07/16/2020    Flu vaccine (Season Ended) 10/20/2021 (Originally 9/1/2021)    Potassium monitoring  09/09/2021    Creatinine monitoring  09/09/2021    Colon cancer screen fecal DNA test (Cologuard)  01/20/2024    Pneumococcal 0-64 years Vaccine  Completed    Hepatitis A vaccine  Aged C/ [place] [address] 19 Hepatitis B vaccine  Aged Out    Hib vaccine  Aged Out    Meningococcal (ACWY) vaccine  Aged Out       Subjective:     Review of Systems   Constitutional: Negative for activity change, appetite change, fatigue, fever and unexpected weight change. /78 (Site: Left Upper Arm, Position: Sitting, Cuff Size: Medium Adult)   Pulse 79   Temp 97.7 °F (36.5 °C) (Tympanic)   Ht 6' 4\" (1.93 m)   Wt 197 lb (89.4 kg)   SpO2 98%   BMI 23.98 kg/m²    Respiratory: Negative for cough, shortness of breath and wheezing. Cardiovascular: Negative for chest pain, palpitations and leg swelling. Gastrointestinal: Negative for abdominal pain, constipation, diarrhea, nausea and vomiting. Musculoskeletal: Positive for arthralgias. Skin: Negative for color change, pallor, rash and wound. Neurological: Negative for tremors. Psychiatric/Behavioral: Negative for sleep disturbance. The patient is not nervous/anxious. Objective:     Physical Exam  Vitals signs and nursing note reviewed. Constitutional:       General: He is not in acute distress. Appearance: Normal appearance. He is well-developed. He is not ill-appearing. HENT:      Head: Normocephalic and atraumatic. Neck:      Musculoskeletal: Neck supple. Cardiovascular:      Rate and Rhythm: Normal rate and regular rhythm. Heart sounds: No murmur. Pulmonary:      Effort: Pulmonary effort is normal. No respiratory distress. Breath sounds: Normal breath sounds. No wheezing, rhonchi or rales. Musculoskeletal:      Right lower leg: No edema. Left lower leg: No edema. Skin:     General: Skin is warm and dry. Coloration: Skin is not pale. Findings: No erythema or rash. Neurological:      Mental Status: He is alert and oriented to person, place, and time. Psychiatric:         Mood and Affect: Mood normal.         Behavior: Behavior normal.         Thought Content:  Thought content normal.         Judgment: Judgment normal.       /78 (Site: Left Upper Arm, Position: Sitting, Cuff Size: Medium Adult)   Pulse 79   Temp 97.7 °F (36.5 °C) (Tympanic)   Ht 6' 4\" (1.93 m)   Wt 197 lb (89.4 kg)   SpO2 98%   BMI 23.98 kg/m²     Assessment:       Diagnosis Orders   1. Essential hypertension  Comprehensive Metabolic Panel    Lipid Panel    CBC Auto Differential   2. Hyperlipidemia, unspecified hyperlipidemia type  Lipid Panel   3. Prostate cancer screening  PSA screening   4. Tobacco abuse     5. Carpal tunnel syndrome of right wrist               Plan:      Return in about 6 months (around 10/20/2021) for hypertension. BP stable  Cont present medication  Update labs, new order given, patient agreed to go in very near future  Patient still smoking at least 1 ppd. He is not interested in cessation and declines assistance with this today  Patient with suspected CTS. Recommended home bracing at night and regular stretching. Avoid repetitive motions as much as possible  Follow up if persisting/worsening  Patient agreed with treatment plan    Orders Placed This Encounter   Procedures    Comprehensive Metabolic Panel     Standing Status:   Future     Standing Expiration Date:   4/20/2022    Lipid Panel     Standing Status:   Future     Standing Expiration Date:   4/20/2022     Order Specific Question:   Is Patient Fasting?/# of Hours     Answer:   yes    CBC Auto Differential     Standing Status:   Future     Standing Expiration Date:   4/20/2022    PSA screening     Standing Status:   Future     Standing Expiration Date:   4/20/2022     No orders of the defined types were placed in this encounter. Patient given educational materials - see patient instructions. Discussed use, benefit, and side effects of prescribed medications. All patientquestions answered. Pt voiced understanding. Reviewed health maintenance. Instructedto continue current medications, diet and exercise. Patient agreed with treatmentplan.  Follow up as directed.      Electronically signed by Ankita Cody PA-C on 4/20/2021 at 4:45 PM

## 2021-04-20 NOTE — PROGRESS NOTES
Visit Information    Have you changed or started any medications since your last visit including any over-the-counter medicines, vitamins, or herbal medicines? no   Are you having any side effects from any of your medications? -  no  Have you stopped taking any of your medications? Is so, why? -  no    Have you seen any other physician or provider since your last visit? No  Have you had any other diagnostic tests since your last visit? No  Have you been seen in the emergency room and/or had an admission to a hospital since we last saw you? No  Have you had your routine dental cleaning in the past 6 months? no    Have you activated your Note account? If not, what are your barriers?  Yes     Patient Care Team:  Karel Mcconnell PA-C as PCP - General (Family Medicine)  Karel Mcconnell PA-C as PCP - [facility] Provider  Lucy Hanson DPM as Physician (Podiatry)    Medical History Review  Past Medical, Family, and Social History reviewed and  contribute to the patient presenting condition    Health Maintenance   Topic Date Due    Hepatitis C screen  Never done    HIV screen  Never done    COVID-19 Vaccine (1) Never done    DTaP/Tdap/Td vaccine (1 - Tdap) Never done    Shingles Vaccine (1 of 2) Never done    Lipid screen  07/16/2020    Flu vaccine (Season Ended) 10/20/2021 (Originally 9/1/2021)    Potassium monitoring  09/09/2021    Creatinine monitoring  09/09/2021    Colon cancer screen fecal DNA test (Cologuard)  01/20/2024    Pneumococcal 0-64 years Vaccine  Completed    Hepatitis A vaccine  Aged Out    Hepatitis B vaccine  Aged Out    Hib vaccine  Aged Out    Meningococcal (ACWY) vaccine  Aged Out normal strength/sensation intact/cranial nerves intact/alert and oriented x 3

## 2021-06-22 DIAGNOSIS — I10 ESSENTIAL HYPERTENSION: ICD-10-CM

## 2021-06-22 DIAGNOSIS — E78.2 MIXED HYPERLIPIDEMIA: ICD-10-CM

## 2021-06-22 RX ORDER — LISINOPRIL 20 MG/1
TABLET ORAL
Qty: 90 TABLET | Refills: 3 | Status: SHIPPED | OUTPATIENT
Start: 2021-06-22 | End: 2022-06-16

## 2021-06-22 RX ORDER — ATORVASTATIN CALCIUM 20 MG/1
TABLET, FILM COATED ORAL
Qty: 90 TABLET | Refills: 3 | Status: SHIPPED | OUTPATIENT
Start: 2021-06-22 | End: 2022-06-16

## 2021-09-14 DIAGNOSIS — I10 ESSENTIAL HYPERTENSION: ICD-10-CM

## 2021-09-14 RX ORDER — HYDROCHLOROTHIAZIDE 25 MG/1
TABLET ORAL
Qty: 90 TABLET | Refills: 0 | Status: SHIPPED | OUTPATIENT
Start: 2021-09-14 | End: 2021-12-13

## 2021-10-19 ENCOUNTER — OFFICE VISIT (OUTPATIENT)
Dept: FAMILY MEDICINE CLINIC | Age: 56
End: 2021-10-19
Payer: COMMERCIAL

## 2021-10-19 ENCOUNTER — HOSPITAL ENCOUNTER (OUTPATIENT)
Age: 56
Discharge: HOME | End: 2021-10-19
Payer: COMMERCIAL

## 2021-10-19 VITALS
WEIGHT: 190 LBS | SYSTOLIC BLOOD PRESSURE: 118 MMHG | HEART RATE: 76 BPM | TEMPERATURE: 98.2 F | HEIGHT: 76 IN | OXYGEN SATURATION: 98 % | DIASTOLIC BLOOD PRESSURE: 70 MMHG | BODY MASS INDEX: 23.14 KG/M2

## 2021-10-19 DIAGNOSIS — E78.5 HYPERLIPIDEMIA, UNSPECIFIED HYPERLIPIDEMIA TYPE: ICD-10-CM

## 2021-10-19 DIAGNOSIS — R53.83 FATIGUE, UNSPECIFIED TYPE: ICD-10-CM

## 2021-10-19 DIAGNOSIS — I10 PRIMARY HYPERTENSION: Primary | ICD-10-CM

## 2021-10-19 DIAGNOSIS — I10 ESSENTIAL HYPERTENSION: ICD-10-CM

## 2021-10-19 DIAGNOSIS — R06.02 SOB (SHORTNESS OF BREATH): ICD-10-CM

## 2021-10-19 DIAGNOSIS — Z72.0 TOBACCO ABUSE: ICD-10-CM

## 2021-10-19 LAB
ALBUMIN SERPL-MCNC: 4.7 G/DL (ref 3.5–5.2)
ALBUMIN/GLOBULIN RATIO: 2 (ref 1–2.5)
ALP BLD-CCNC: 73 U/L (ref 40–129)
ALT SERPL-CCNC: 30 U/L (ref 5–41)
ANION GAP SERPL CALCULATED.3IONS-SCNC: 14 MMOL/L (ref 9–17)
AST SERPL-CCNC: 21 U/L
BILIRUB SERPL-MCNC: 0.44 MG/DL (ref 0.3–1.2)
BUN BLDV-MCNC: 23 MG/DL (ref 6–20)
BUN/CREAT BLD: ABNORMAL (ref 9–20)
CALCIUM SERPL-MCNC: 9.6 MG/DL (ref 8.6–10.4)
CHLORIDE BLD-SCNC: 101 MMOL/L (ref 98–107)
CHOLESTEROL/HDL RATIO: 3.7
CHOLESTEROL: 172 MG/DL
CO2: 20 MMOL/L (ref 20–31)
CREAT SERPL-MCNC: 0.98 MG/DL (ref 0.7–1.2)
GFR AFRICAN AMERICAN: >60 ML/MIN
GFR NON-AFRICAN AMERICAN: >60 ML/MIN
GFR SERPL CREATININE-BSD FRML MDRD: ABNORMAL ML/MIN/{1.73_M2}
GFR SERPL CREATININE-BSD FRML MDRD: ABNORMAL ML/MIN/{1.73_M2}
GLUCOSE BLD-MCNC: 92 MG/DL (ref 70–99)
HDLC SERPL-MCNC: 46 MG/DL
LDL CHOLESTEROL: 87 MG/DL (ref 0–130)
POTASSIUM SERPL-SCNC: 4.6 MMOL/L (ref 3.7–5.3)
SODIUM BLD-SCNC: 135 MMOL/L (ref 135–144)
TOTAL PROTEIN: 7 G/DL (ref 6.4–8.3)
TRIGL SERPL-MCNC: 194 MG/DL
VLDLC SERPL CALC-MCNC: ABNORMAL MG/DL (ref 1–30)

## 2021-10-19 PROCEDURE — G8484 FLU IMMUNIZE NO ADMIN: HCPCS | Performed by: PHYSICIAN ASSISTANT

## 2021-10-19 PROCEDURE — 80061 LIPID PANEL: CPT

## 2021-10-19 PROCEDURE — G0103 PSA SCREENING: HCPCS

## 2021-10-19 PROCEDURE — G8427 DOCREV CUR MEDS BY ELIG CLIN: HCPCS | Performed by: PHYSICIAN ASSISTANT

## 2021-10-19 PROCEDURE — 84443 ASSAY THYROID STIM HORMONE: CPT

## 2021-10-19 PROCEDURE — 84270 ASSAY OF SEX HORMONE GLOBUL: CPT

## 2021-10-19 PROCEDURE — 4004F PT TOBACCO SCREEN RCVD TLK: CPT | Performed by: PHYSICIAN ASSISTANT

## 2021-10-19 PROCEDURE — 99214 OFFICE O/P EST MOD 30 MIN: CPT | Performed by: PHYSICIAN ASSISTANT

## 2021-10-19 PROCEDURE — 3017F COLORECTAL CA SCREEN DOC REV: CPT | Performed by: PHYSICIAN ASSISTANT

## 2021-10-19 PROCEDURE — 80053 COMPREHEN METABOLIC PANEL: CPT

## 2021-10-19 PROCEDURE — G8420 CALC BMI NORM PARAMETERS: HCPCS | Performed by: PHYSICIAN ASSISTANT

## 2021-10-19 PROCEDURE — 84403 ASSAY OF TOTAL TESTOSTERONE: CPT

## 2021-10-19 PROCEDURE — 36415 COLL VENOUS BLD VENIPUNCTURE: CPT

## 2021-10-19 RX ORDER — VARENICLINE TARTRATE 0.5 MG/1
TABLET, FILM COATED ORAL
Qty: 95 TABLET | Refills: 0 | Status: SHIPPED | OUTPATIENT
Start: 2021-10-19

## 2021-10-19 RX ORDER — ALBUTEROL SULFATE 90 UG/1
2 AEROSOL, METERED RESPIRATORY (INHALATION) 4 TIMES DAILY PRN
Qty: 18 G | Refills: 0 | Status: SHIPPED | OUTPATIENT
Start: 2021-10-19

## 2021-10-19 ASSESSMENT — ENCOUNTER SYMPTOMS
VOMITING: 0
NAUSEA: 0
SHORTNESS OF BREATH: 1
DIARRHEA: 0
WHEEZING: 0
COLOR CHANGE: 0
CONSTIPATION: 0
ABDOMINAL PAIN: 0
COUGH: 0

## 2021-10-19 NOTE — PROGRESS NOTES
[address] [facility]  [address] [facility]  [address] [facility] [address]  Dept: [phone]  Dept Fax: [phone]    Niles Garza is a 61 y.o. male who presents today for his medical conditions/complaintsas noted below. Niles Garza is c/o of   Chief Complaint   Patient presents with    Hypertension     did labs today         HPI:     Hypertension  This is a chronic problem. The current episode started more than 1 year ago. The problem is unchanged. The problem is controlled. Associated symptoms include shortness of breath. Pertinent negatives include no chest pain, palpitations or peripheral edema. Risk factors for coronary artery disease include family history and male gender. Past treatments include ACE inhibitors and diuretics. The current treatment provides moderate improvement. There are no compliance problems. patient just completed labs today. Still awaiting results. Looks like just cmp and lipids were ordered. Patient states he has continued to smoke, currently smoking 1.5 packs a day. He is interested in trying chantix. Had tried that briefly in the past and tolerated ok. He states has noticed more SOB when doing things like mowing grass. No chest pain noted. Working a lot, has been noticing more fatigue with that.  Sleeps ok but does tend to wake up 2-3 times at night to urinate    No results found for: LABA1C          ( goal A1Cis < 7)   No results found for: LABMICR  LDL Cholesterol (mg/dL)   Date Value   04/17/2018 98   09/15/2015 139 (H)   10/14/2014 101       (goal LDL is <100)   AST (U/L)   Date Value   07/16/2019 31     ALT (U/L)   Date Value   07/16/2019 31     BUN (mg/dL)   Date Value   09/09/2020 12     BP Readings from Last 3 Encounters:   10/19/21 118/70   04/20/21 128/78   12/21/20 128/81          (goal 120/80)    Past Medical History:   Diagnosis Date    Hyperlipidemia     Hypertension     TIA (transient ischemic attack)     Early 1990's  Wears dentures     Upper only at present time.  Wears glasses       Past Surgical History:   Procedure Laterality Date    EYE SURGERY      Removal of foreign object in Right eye in 1980's per pt.  ROTATOR CUFF REPAIR Left 2011    SHOULDER ARTHROSCOPY Left 9/15/2020    LEFT SHOULDER ARTHROSCOPY WITH ROTATOR CUFF REPAIR SUPERIOR CAPSULAR RECONSTRUCTION EXTENSIVE DEBRIDEMENT performed by Eldon Cramer DO at [address] [facility] History   Problem Relation Age of Onset    Alzheimer's Disease Mother     Hypertension Maternal Grandfather     Cancer Neg Hx        Social History     Tobacco Use    Smoking status: Current Every Day Smoker     Packs/day: 0.50     Years: 15.00     Pack years: 7.50     Types: Cigarettes    Smokeless tobacco: Never Used   Substance Use Topics    Alcohol use: Yes     Alcohol/week: 12.0 standard drinks     Types: 12 Cans of beer per week      Current Outpatient Medications   Medication Sig Dispense Refill    varenicline (CHANTIX) 0.5 MG tablet 0.5 mg po q am x 3 days. Then, 0.5 mg bid x 4 days. Then, one 1 mg bid x 15 weeks 95 tablet 0    albuterol sulfate HFA (VENTOLIN HFA) 108 (90 Base) MCG/ACT inhaler Inhale 2 puffs into the lungs 4 times daily as needed for Wheezing 18 g 0    hydroCHLOROthiazide (HYDRODIURIL) 25 MG tablet TAKE 1 TABLET EVERY MORNING 90 tablet 0    atorvastatin (LIPITOR) 20 MG tablet TAKE 1 TABLET DAILY 90 tablet 3    lisinopril (PRINIVIL;ZESTRIL) 20 MG tablet TAKE 1 TABLET DAILY 90 tablet 3    Omega-3 Fatty Acids (FISH OIL) 1000 MG CAPS Take 3,000 mg by mouth 3 times daily       No current facility-administered medications for this visit.      No Known Allergies    Health Maintenance   Topic Date Due    Hepatitis C screen  Never done    COVID-19 Vaccine (1) Never done    HIV screen  Never done    DTaP/Tdap/Td vaccine (1 - Tdap) Never done    Shingles Vaccine (1 of 2) Never done    Lipid screen  07/16/2020    Flu vaccine (1) Never done    Potassium monitoring  09/09/2021    Creatinine monitoring  09/09/2021    Colon cancer screen fecal DNA test (Cologuard)  01/20/2024    Pneumococcal 0-64 years Vaccine (2 of 2 - PPSV23) 09/26/2029    Hepatitis A vaccine  Aged Out    Hepatitis B vaccine  Aged Out    Hib vaccine  Aged Out    Meningococcal (ACWY) vaccine  Aged Out       Subjective:     Review of Systems   Constitutional: Positive for fatigue. Negative for activity change, appetite change, fever and unexpected weight change. /70 (Site: Left Upper Arm, Position: Sitting, Cuff Size: Large Adult)   Pulse 76   Temp 98.2 °F (36.8 °C) (Tympanic)   Ht 6' 4\" (1.93 m)   Wt 190 lb (86.2 kg)   SpO2 98%   BMI 23.13 kg/m²    Respiratory: Positive for shortness of breath. Negative for cough and wheezing. Cardiovascular: Negative for chest pain, palpitations and leg swelling. Gastrointestinal: Negative for abdominal pain, constipation, diarrhea, nausea and vomiting. Genitourinary: Negative for dysuria. Skin: Negative for color change, pallor, rash and wound. Neurological: Negative for weakness. Psychiatric/Behavioral: Negative for sleep disturbance. The patient is not nervous/anxious. Objective:     Physical Exam  Vitals and nursing note reviewed. Constitutional:       General: He is not in acute distress. Appearance: Normal appearance. He is well-developed. He is not ill-appearing. HENT:      Head: Normocephalic and atraumatic. Cardiovascular:      Rate and Rhythm: Normal rate and regular rhythm. Heart sounds: No murmur heard. Pulmonary:      Effort: Pulmonary effort is normal. No respiratory distress. Breath sounds: Normal breath sounds. No wheezing, rhonchi or rales. Musculoskeletal:      Left lower leg: No edema. Skin:     General: Skin is warm and dry. Coloration: Skin is not pale. Findings: No erythema or rash.    Neurological:      Mental Status: He is alert and oriented to person, place, and time. Psychiatric:         Mood and Affect: Mood normal.         Behavior: Behavior normal.         Thought Content: Thought content normal.         Judgment: Judgment normal.       /70 (Site: Left Upper Arm, Position: Sitting, Cuff Size: Large Adult)   Pulse 76   Temp 98.2 °F (36.8 °C) (Tympanic)   Ht 6' 4\" (1.93 m)   Wt 190 lb (86.2 kg)   SpO2 98%   BMI 23.13 kg/m²     Assessment:       Diagnosis Orders   1. Primary hypertension     2. Tobacco abuse  varenicline (CHANTIX) 0.5 MG tablet   3. Fatigue, unspecified type  TSH with Reflex    Testosterone Free and Total Male   4. SOB (shortness of breath)  XR CHEST (2 VW)    Full PFT Study With Bronchodilator    albuterol sulfate HFA (VENTOLIN HFA) 108 (90 Base) MCG/ACT inhaler             Plan:      Return in about 4 weeks (around 11/16/2021) for review results. BP stable, cont present medication  Will check CXR and PFT. Start ventolin for prn use for symptoms. Patient motivated to stop smoking. Discussed use and SE of chantix. He agreed with trying this again  Called lab, they did not run cbc or psa. Also added testosterone and TSH due to fatigue  Encouraged him to avoid late night fluids prior to bed to help limit night time wakings  Await all results  Follow up after testing to review  Pt agreed with treatment plan    Orders Placed This Encounter   Procedures    XR CHEST (2 VW)     Standing Status:   Future     Standing Expiration Date:   10/19/2022     Order Specific Question:   Reason for exam:     Answer:   SOB    TSH with Reflex     Standing Status:   Future     Standing Expiration Date:   10/19/2022    Testosterone Free and Total Male     Needs obtained first thing in the morning, fasting     Standing Status:   Future     Standing Expiration Date:   10/19/2022    Full PFT Study With Bronchodilator     Standing Status:   Future     Standing Expiration Date:   10/19/2022     Scheduling Instructions:       This is to be done at the [facility] Orders Placed This Encounter   Medications    varenicline (CHANTIX) 0.5 MG tablet     Si.5 mg po q am x 3 days. Then, 0.5 mg bid x 4 days. Then, one 1 mg bid x 15 weeks     Dispense:  95 tablet     Refill:  0    albuterol sulfate HFA (VENTOLIN HFA) 108 (90 Base) MCG/ACT inhaler     Sig: Inhale 2 puffs into the lungs 4 times daily as needed for Wheezing     Dispense:  18 g     Refill:  0       Patient given educational materials - see patient instructions. Discussed use, benefit, and side effects of prescribed medications. All patientquestions answered. Pt voiced understanding. Reviewed health maintenance. Instructedto continue current medications, diet and exercise. Patient agreed with treatmentplan. Follow up as directed.      Electronically signed by Vinod Gonzalez PA-C on 10/19/2021 at 4:46 PM

## 2021-10-19 NOTE — PROGRESS NOTES
Visit Information    Have you changed or started any medications since your last visit including any over-the-counter medicines, vitamins, or herbal medicines? no   Are you having any side effects from any of your medications? -  no  Have you stopped taking any of your medications? Is so, why? -  no    Have you seen any other physician or provider since your last visit? No  Have you had any other diagnostic tests since your last visit? No  Have you been seen in the emergency room and/or had an admission to a hospital since we last saw you? No  Have you had your routine dental cleaning in the past 6 months? no    Have you activated your [facility] account? If not, what are your barriers?  Yes     Patient Care Team:  Vivien Guzman PA-C as PCP - General (Family Medicine)  [facility]iv[facility]C [facility]  Flores Bartholomew DPM as Physician (Podiatry)    Medical History Review  Past Medical, Family, and Social History reviewed and  contribute to the patient presenting condition    Health Maintenance   Topic Date Due    Hepatitis C screen  Never done    COVID-19 Vaccine (1) Never done    HIV screen  Never done    DTaP/Tdap/Td vaccine (1 - Tdap) Never done    Shingles Vaccine (1 of 2) Never done    Lipid screen  07/16/2020    Flu vaccine (1) Never done    Potassium monitoring  09/09/2021    Creatinine monitoring  09/09/2021    Colon cancer screen fecal DNA test (Cologuard)  01/20/2024    Pneumococcal 0-64 years Vaccine (2 of 2 - PPSV23) 09/26/2029    Hepatitis A vaccine  Aged Out    Hepatitis B vaccine  Aged Out    Hib vaccine  Aged Out    Meningococcal (ACWY) vaccine  Aged Out

## 2021-10-20 ENCOUNTER — TELEPHONE (OUTPATIENT)
Dept: FAMILY MEDICINE CLINIC | Age: 56
End: 2021-10-20

## 2021-10-20 LAB
PROSTATE SPECIFIC ANTIGEN: 1.07 UG/L
SEX HORMONE BINDING GLOBULIN: 40 NMOL/L (ref 11–80)
TESTOSTERONE FREE-NONMALE: 55.1 PG/ML (ref 47–244)
TESTOSTERONE TOTAL: 314 NG/DL (ref 220–1000)
TSH SERPL DL<=0.05 MIU/L-ACNC: 1.43 MIU/L (ref 0.3–5)

## 2021-10-20 RX ORDER — VARENICLINE TARTRATE 1 MG/1
TABLET, FILM COATED ORAL
Qty: 60 TABLET | Refills: 2 | Status: SHIPPED | OUTPATIENT
Start: 2021-10-20

## 2021-10-20 NOTE — TELEPHONE ENCOUNTER
Ava from the pharmacy called stating that they received the first part of the chantix prescription, however; they need the script for the 15 weeks sent over. She stated it would need to be a bottle of 56 with 3 refills. Please advise.

## 2021-11-01 ENCOUNTER — HOSPITAL ENCOUNTER (OUTPATIENT)
Dept: LAB | Age: 56
Setting detail: SPECIMEN
Discharge: HOME | End: 2021-11-01
Payer: COMMERCIAL

## 2021-11-01 DIAGNOSIS — Z01.818 PREOP TESTING: Primary | ICD-10-CM

## 2021-12-13 DIAGNOSIS — I10 ESSENTIAL HYPERTENSION: ICD-10-CM

## 2021-12-13 RX ORDER — HYDROCHLOROTHIAZIDE 25 MG/1
TABLET ORAL
Qty: 90 TABLET | Refills: 3 | Status: SHIPPED | OUTPATIENT
Start: 2021-12-13

## 2022-06-16 DIAGNOSIS — I10 ESSENTIAL HYPERTENSION: ICD-10-CM

## 2022-06-16 DIAGNOSIS — E78.2 MIXED HYPERLIPIDEMIA: ICD-10-CM

## 2022-06-16 RX ORDER — LISINOPRIL 20 MG/1
TABLET ORAL
Qty: 90 TABLET | Refills: 3 | Status: SHIPPED | OUTPATIENT
Start: 2022-06-16

## 2022-06-16 RX ORDER — ATORVASTATIN CALCIUM 20 MG/1
TABLET, FILM COATED ORAL
Qty: 90 TABLET | Refills: 3 | Status: SHIPPED | OUTPATIENT
Start: 2022-06-16

## 2022-10-26 NOTE — FLOWSHEET NOTE
Contact Information: If you have any questions, concerns, pended studies, tests and/or procedures, or emergencies regarding your inpatient behavioral health visit  Please contact [address] [place] [address] older adult behavioral health unit 6T [phone] and ask to speak to a , nurse or physician  A contact is available 24 hours/ 7 days a week at this number  Summary of Procedures Performed During your Stay:  Below is a list of major procedures performed during your hospital stay and a summary of results:  - Cardiac Procedures/Studies: EKG  Pending Studies (From admission, onward)    None        Please follow up on the above pending studies with your PCP and/or referring provider  [] [facility]  [address]  [phone]  F: [phone] [x] [address]  [facility]   [address]  P: [phone]  F: [phone] [] [place]  [address]  P: [phone]  F: [phone] [] [address]  [facility]   [address]   [place]: [phone]  F: [phone]      Physical Therapy Daily Treatment Note    Date:  2020  Patient Name:  Katarzyna Horvath    :  1965  MRN: [id number]  Physician: Heraclio Abbott D.O. [place] [facility] [place]  Medical Diagnosis: s/p Left shoulder superior capsule reconstruction; RC repair             Rehab Codes: J81.911, M25.612, [id number]  Onset Date: fall 20; surgery 9/15/20                 Next 's appt: 2020  Visit# / total visits: (updated POC signed 10/12/20)     Cancels/No Shows: 0/1     Subjective:    Pain:  [] Yes  [x] No Location: L shoulder Pain Rating: (0-10 scale)  0/10  Pain altered Tx:  [x] No  [] Yes  Action:    Comments:  Pt states the shoulder felt good today     Objective:  Modalities:  Game Ready to L shoulder min compression following PROM for 10'- HELD   Precautions: PROM only;  No active ROM Left shoulder  Exercises:     Exercise Reps/ Time Weight/ Level Comments             Pendulum exercises X   CW/CCW - HEP             PROM-L shoulder     Mobilization with movement (MWM) for ER (posterior glide)         seated      Shoulder shrugs x15     Scapular retraction x15           deltoid isometrics-gentle          flex x15       ABD x15     IR 15x     ER 15x     ext 15x           Elbow/hand ROM x15  Elbow flex/ext                               Other: 9 wks post op on 2020      Treatment Charges: Mins Units   []  Modalities     [x]  Ther Exercise 30 2   []  Manual

## 2022-12-09 DIAGNOSIS — I10 ESSENTIAL HYPERTENSION: ICD-10-CM

## 2022-12-11 RX ORDER — HYDROCHLOROTHIAZIDE 25 MG/1
TABLET ORAL
Qty: 90 TABLET | Refills: 3 | Status: SHIPPED | OUTPATIENT
Start: 2022-12-11

## 2023-05-03 DIAGNOSIS — E78.2 MIXED HYPERLIPIDEMIA: ICD-10-CM

## 2023-05-03 DIAGNOSIS — I10 ESSENTIAL HYPERTENSION: ICD-10-CM

## 2023-05-04 RX ORDER — ATORVASTATIN CALCIUM 20 MG/1
20 TABLET, FILM COATED ORAL DAILY
Qty: 30 TABLET | Refills: 0 | Status: SHIPPED | OUTPATIENT
Start: 2023-05-04

## 2023-05-04 RX ORDER — LISINOPRIL 20 MG/1
20 TABLET ORAL DAILY
Qty: 30 TABLET | Refills: 0 | Status: SHIPPED | OUTPATIENT
Start: 2023-05-04

## 2023-05-04 RX ORDER — HYDROCHLOROTHIAZIDE 25 MG/1
25 TABLET ORAL EVERY MORNING
Qty: 30 TABLET | Refills: 0 | Status: SHIPPED | OUTPATIENT
Start: 2023-05-04

## 2023-06-05 ENCOUNTER — NURSE ONLY (OUTPATIENT)
Dept: PRIMARY CARE CLINIC | Age: 58
End: 2023-06-05

## 2023-06-05 ENCOUNTER — OFFICE VISIT (OUTPATIENT)
Dept: FAMILY MEDICINE CLINIC | Age: 58
End: 2023-06-05
Payer: COMMERCIAL

## 2023-06-05 VITALS
OXYGEN SATURATION: 97 % | BODY MASS INDEX: 23.38 KG/M2 | WEIGHT: 192 LBS | TEMPERATURE: 97.9 F | SYSTOLIC BLOOD PRESSURE: 134 MMHG | HEIGHT: 76 IN | DIASTOLIC BLOOD PRESSURE: 70 MMHG | HEART RATE: 84 BPM

## 2023-06-05 DIAGNOSIS — Z87.891 PERSONAL HISTORY OF TOBACCO USE: ICD-10-CM

## 2023-06-05 DIAGNOSIS — Z12.5 SCREENING FOR PROSTATE CANCER: ICD-10-CM

## 2023-06-05 DIAGNOSIS — Z13.1 SCREENING FOR DIABETES MELLITUS: ICD-10-CM

## 2023-06-05 DIAGNOSIS — M54.10 RADICULOPATHY AFFECTING UPPER EXTREMITY: Primary | ICD-10-CM

## 2023-06-05 DIAGNOSIS — Z72.0 TOBACCO ABUSE: ICD-10-CM

## 2023-06-05 DIAGNOSIS — I10 ESSENTIAL HYPERTENSION: Primary | ICD-10-CM

## 2023-06-05 DIAGNOSIS — M54.10 RADICULOPATHY AFFECTING UPPER EXTREMITY: ICD-10-CM

## 2023-06-05 DIAGNOSIS — E78.2 MIXED HYPERLIPIDEMIA: ICD-10-CM

## 2023-06-05 PROCEDURE — G0296 VISIT TO DETERM LDCT ELIG: HCPCS | Performed by: PHYSICIAN ASSISTANT

## 2023-06-05 PROCEDURE — 99214 OFFICE O/P EST MOD 30 MIN: CPT | Performed by: PHYSICIAN ASSISTANT

## 2023-06-05 PROCEDURE — 3075F SYST BP GE 130 - 139MM HG: CPT | Performed by: PHYSICIAN ASSISTANT

## 2023-06-05 PROCEDURE — 3078F DIAST BP <80 MM HG: CPT | Performed by: PHYSICIAN ASSISTANT

## 2023-06-05 RX ORDER — LANOLIN ALCOHOL/MO/W.PET/CERES
1000 CREAM (GRAM) TOPICAL DAILY
COMMUNITY

## 2023-06-05 RX ORDER — NICOTINE 21 MG/24HR
1 PATCH, TRANSDERMAL 24 HOURS TRANSDERMAL EVERY 24 HOURS
Qty: 30 PATCH | Refills: 3 | Status: SHIPPED | OUTPATIENT
Start: 2023-06-05 | End: 2024-06-04

## 2023-06-05 SDOH — ECONOMIC STABILITY: INCOME INSECURITY: HOW HARD IS IT FOR YOU TO PAY FOR THE VERY BASICS LIKE FOOD, HOUSING, MEDICAL CARE, AND HEATING?: NOT HARD AT ALL

## 2023-06-05 SDOH — ECONOMIC STABILITY: FOOD INSECURITY: WITHIN THE PAST 12 MONTHS, YOU WORRIED THAT YOUR FOOD WOULD RUN OUT BEFORE YOU GOT MONEY TO BUY MORE.: NEVER TRUE

## 2023-06-05 SDOH — ECONOMIC STABILITY: FOOD INSECURITY: WITHIN THE PAST 12 MONTHS, THE FOOD YOU BOUGHT JUST DIDN'T LAST AND YOU DIDN'T HAVE MONEY TO GET MORE.: NEVER TRUE

## 2023-06-05 SDOH — ECONOMIC STABILITY: HOUSING INSECURITY
IN THE LAST 12 MONTHS, WAS THERE A TIME WHEN YOU DID NOT HAVE A STEADY PLACE TO SLEEP OR SLEPT IN A SHELTER (INCLUDING NOW)?: NO

## 2023-06-05 ASSESSMENT — ENCOUNTER SYMPTOMS
DIARRHEA: 0
COUGH: 0
NAUSEA: 0
COLOR CHANGE: 0
SHORTNESS OF BREATH: 0
BACK PAIN: 0
VOMITING: 0
WHEEZING: 0
CONSTIPATION: 0
ABDOMINAL PAIN: 0

## 2023-06-05 ASSESSMENT — PATIENT HEALTH QUESTIONNAIRE - PHQ9
2. FEELING DOWN, DEPRESSED OR HOPELESS: 0
SUM OF ALL RESPONSES TO PHQ QUESTIONS 1-9: 0
SUM OF ALL RESPONSES TO PHQ9 QUESTIONS 1 & 2: 0
SUM OF ALL RESPONSES TO PHQ QUESTIONS 1-9: 0
1. LITTLE INTEREST OR PLEASURE IN DOING THINGS: 0

## 2023-06-05 NOTE — PROGRESS NOTES
Visit Information    Have you changed or started any medications since your last visit including any over-the-counter medicines, vitamins, or herbal medicines? no   Are you having any side effects from any of your medications? -  no  Have you stopped taking any of your medications? Is so, why? -  no    Have you seen any other physician or provider since your last visit? No  Have you had any other diagnostic tests since your last visit? No  Have you been seen in the emergency room and/or had an admission to a hospital since we last saw you? No  Have you had your routine dental cleaning in the past 6 months? no    Have you activated your [employer] account? If not, what are your barriers?  Yes     Patient Care Team:  Radha Pollard PA-C as PCP - General (Family Medicine)  Radha Pollard PA-C as PCP - Empaneled Provider  Tariq Bob DPM as Physician (Podiatry)    Medical History Review  Past Medical, Family, and Social History reviewed and  contribute to the patient presenting condition    Health Maintenance   Topic Date Due    COVID-19 Vaccine (1) Never done    Depression Screen  Never done    HIV screen  Never done    Hepatitis C screen  Never done    DTaP/Tdap/Td vaccine (1 - Tdap) Never done    Shingles vaccine (1 of 2) Never done    Lipids  10/19/2022    Flu vaccine (Season Ended) 08/01/2023    Colorectal Cancer Screen  01/20/2024    Pneumococcal 0-64 years Vaccine  Completed    Hepatitis A vaccine  Aged Out    Hib vaccine  Aged Out    Meningococcal (ACWY) vaccine  Aged Out    Prostate Specific Antigen (PSA) Screening or Monitoring  Discontinued
Left forearm: No swelling, edema or tenderness. Left wrist: No swelling, deformity or tenderness. Cervical back: Neck supple. No swelling, deformity, rigidity, spasms, tenderness or crepitus. No pain with movement. Normal range of motion. Comments: Tinel's and Phalen's on the left   Lymphadenopathy:      Cervical: No cervical adenopathy. Skin:     General: Skin is warm and dry. Coloration: Skin is not pale. Findings: No erythema or rash. Neurological:      Mental Status: He is alert and oriented to person, place, and time. Psychiatric:         Mood and Affect: Mood normal.         Behavior: Behavior normal.         Thought Content: Thought content normal.         Judgment: Judgment normal.     /70 (Site: Left Upper Arm, Position: Sitting, Cuff Size: Medium Adult)   Pulse 84   Temp 97.9 °F (36.6 °C) (Tympanic)   Ht 6' 4\" (1.93 m)   Wt 192 lb (87.1 kg)   SpO2 97%   BMI 23.37 kg/m²     Assessment:       Diagnosis Orders   1. Essential hypertension  Comprehensive Metabolic Panel    Lipid Panel    CBC with Auto Differential    TSH with Reflex      2. Mixed hyperlipidemia  Comprehensive Metabolic Panel    Lipid Panel    CBC with Auto Differential    TSH with Reflex      3. Tobacco abuse  nicotine (NICODERM CQ) 21 MG/24HR      4. Screening for diabetes mellitus  Hemoglobin A1C      5. Screening for prostate cancer  PSA Screening      6. Radiculopathy affecting upper extremity  XR CERVICAL SPINE (2-3 VIEWS)      7. Personal history of tobacco use  NC VISIT TO DISCUSS LUNG CA SCREEN W LDCT    CT Lung Screen (Annual/Baseline)                Plan:      Return in about 6 months (around 12/5/2023) for hypertension, med review. Blood pressure is stable. I did encourage him to take medication every day. Limit caffeine and salt in diet. We will go ahead and update labs at this time. Stressed need for smoking cessation. Patient agreed to try NicoDerm patch.   I stressed absolutely

## 2023-06-06 DIAGNOSIS — R06.02 SOB (SHORTNESS OF BREATH): ICD-10-CM

## 2023-06-06 DIAGNOSIS — E78.2 MIXED HYPERLIPIDEMIA: ICD-10-CM

## 2023-06-06 DIAGNOSIS — I10 ESSENTIAL HYPERTENSION: ICD-10-CM

## 2023-06-06 RX ORDER — ALBUTEROL SULFATE 90 UG/1
2 AEROSOL, METERED RESPIRATORY (INHALATION) 4 TIMES DAILY PRN
Qty: 18 G | Refills: 0 | Status: SHIPPED | OUTPATIENT
Start: 2023-06-06

## 2023-06-06 RX ORDER — ATORVASTATIN CALCIUM 20 MG/1
20 TABLET, FILM COATED ORAL DAILY
Qty: 30 TABLET | Refills: 0 | Status: SHIPPED | OUTPATIENT
Start: 2023-06-06

## 2023-06-06 RX ORDER — LISINOPRIL 20 MG/1
20 TABLET ORAL DAILY
Qty: 30 TABLET | Refills: 0 | Status: SHIPPED | OUTPATIENT
Start: 2023-06-06

## 2023-06-06 RX ORDER — HYDROCHLOROTHIAZIDE 25 MG/1
25 TABLET ORAL EVERY MORNING
Qty: 30 TABLET | Refills: 0 | Status: SHIPPED | OUTPATIENT
Start: 2023-06-06

## 2023-06-07 ENCOUNTER — TELEPHONE (OUTPATIENT)
Dept: ONCOLOGY | Age: 58
End: 2023-06-07

## 2023-06-11 DIAGNOSIS — M54.10 RADICULOPATHY AFFECTING UPPER EXTREMITY: Primary | ICD-10-CM

## 2023-06-21 ENCOUNTER — TELEPHONE (OUTPATIENT)
Dept: FAMILY MEDICINE CLINIC | Age: 58
End: 2023-06-21

## 2023-06-21 NOTE — TELEPHONE ENCOUNTER
Patient called in inquiring about his CT Lung screen results. Patient got the CT done on 6/14/23 and when I checked on the status of the results, it still says \"active-in process. \" Patient states that wife is just really concerned about results. Unsure of if there is anything that can be done to obtain results. Please advise.

## 2023-06-28 DIAGNOSIS — I10 ESSENTIAL HYPERTENSION: ICD-10-CM

## 2023-06-28 DIAGNOSIS — E78.2 MIXED HYPERLIPIDEMIA: ICD-10-CM

## 2023-06-28 RX ORDER — LISINOPRIL 20 MG/1
20 TABLET ORAL DAILY
Qty: 30 TABLET | Refills: 11 | Status: SHIPPED | OUTPATIENT
Start: 2023-06-28

## 2023-06-28 RX ORDER — ATORVASTATIN CALCIUM 20 MG/1
TABLET, FILM COATED ORAL
Qty: 30 TABLET | Refills: 11 | Status: SHIPPED | OUTPATIENT
Start: 2023-06-28

## 2023-06-28 RX ORDER — HYDROCHLOROTHIAZIDE 25 MG/1
TABLET ORAL
Qty: 30 TABLET | Refills: 11 | Status: SHIPPED | OUTPATIENT
Start: 2023-06-28

## 2023-11-15 DIAGNOSIS — I10 ESSENTIAL HYPERTENSION: ICD-10-CM

## 2023-11-15 DIAGNOSIS — E78.2 MIXED HYPERLIPIDEMIA: ICD-10-CM

## 2023-11-15 RX ORDER — HYDROCHLOROTHIAZIDE 25 MG/1
25 TABLET ORAL EVERY MORNING
Qty: 90 TABLET | Refills: 1 | Status: SHIPPED | OUTPATIENT
Start: 2023-11-15

## 2023-11-15 RX ORDER — ATORVASTATIN CALCIUM 20 MG/1
20 TABLET, FILM COATED ORAL DAILY
Qty: 90 TABLET | Refills: 1 | Status: SHIPPED | OUTPATIENT
Start: 2023-11-15

## 2023-11-15 RX ORDER — LISINOPRIL 20 MG/1
20 TABLET ORAL DAILY
Qty: 90 TABLET | Refills: 1 | Status: SHIPPED | OUTPATIENT
Start: 2023-11-15

## 2024-02-23 DIAGNOSIS — E78.2 MIXED HYPERLIPIDEMIA: ICD-10-CM

## 2024-02-23 DIAGNOSIS — I10 ESSENTIAL HYPERTENSION: ICD-10-CM

## 2024-02-23 RX ORDER — LISINOPRIL 20 MG/1
20 TABLET ORAL DAILY
Qty: 90 TABLET | Refills: 1 | Status: SHIPPED | OUTPATIENT
Start: 2024-02-23

## 2024-02-23 RX ORDER — ATORVASTATIN CALCIUM 20 MG/1
20 TABLET, FILM COATED ORAL DAILY
Qty: 90 TABLET | Refills: 1 | Status: SHIPPED | OUTPATIENT
Start: 2024-02-23

## 2024-02-23 RX ORDER — HYDROCHLOROTHIAZIDE 25 MG/1
25 TABLET ORAL EVERY MORNING
Qty: 90 TABLET | Refills: 1 | Status: SHIPPED | OUTPATIENT
Start: 2024-02-23

## 2024-04-18 ENCOUNTER — OFFICE VISIT (OUTPATIENT)
Dept: PRIMARY CARE CLINIC | Age: 59
End: 2024-04-18
Payer: COMMERCIAL

## 2024-04-18 VITALS
TEMPERATURE: 97.4 F | DIASTOLIC BLOOD PRESSURE: 80 MMHG | SYSTOLIC BLOOD PRESSURE: 117 MMHG | HEART RATE: 55 BPM | OXYGEN SATURATION: 98 %

## 2024-04-18 DIAGNOSIS — J40 BRONCHITIS: Primary | ICD-10-CM

## 2024-04-18 PROCEDURE — 3074F SYST BP LT 130 MM HG: CPT | Performed by: NURSE PRACTITIONER

## 2024-04-18 PROCEDURE — 99213 OFFICE O/P EST LOW 20 MIN: CPT | Performed by: NURSE PRACTITIONER

## 2024-04-18 PROCEDURE — 3079F DIAST BP 80-89 MM HG: CPT | Performed by: NURSE PRACTITIONER

## 2024-04-18 RX ORDER — AZITHROMYCIN 250 MG/1
TABLET, FILM COATED ORAL
Qty: 1 PACKET | Refills: 0 | Status: SHIPPED | OUTPATIENT
Start: 2024-04-18

## 2024-04-18 RX ORDER — PREDNISONE 20 MG/1
40 TABLET ORAL DAILY
Qty: 10 TABLET | Refills: 0 | Status: SHIPPED | OUTPATIENT
Start: 2024-04-18 | End: 2024-04-23

## 2024-04-18 ASSESSMENT — ENCOUNTER SYMPTOMS
EYE REDNESS: 0
SHORTNESS OF BREATH: 1
SORE THROAT: 1
EYE DISCHARGE: 0
VOICE CHANGE: 0
WHEEZING: 0
COUGH: 1
CHEST TIGHTNESS: 1
SINUS PRESSURE: 0

## 2024-04-18 NOTE — PROGRESS NOTES
24 hours (Patient not taking: Reported on 4/18/2024) 30 patch 3    Omega-3 Fatty Acids (FISH OIL) 1000 MG CAPS Take 3,000 mg by mouth 3 times daily (Patient not taking: Reported on 6/5/2023)       No current facility-administered medications for this visit.     No Known Allergies    Subjective:      Review of Systems   Constitutional:  Negative for chills, fatigue and fever.   HENT:  Positive for congestion, postnasal drip and sore throat. Negative for ear discharge, ear pain, sinus pressure, sneezing and voice change.    Eyes:  Negative for discharge and redness.   Respiratory:  Positive for cough, chest tightness and shortness of breath (mild). Negative for wheezing.    Cardiovascular: Negative.  Negative for chest pain.   Musculoskeletal:  Negative for myalgias.   Skin:  Negative for rash.   Neurological:  Positive for headaches. Negative for dizziness, weakness and light-headedness.   Hematological:  Negative for adenopathy.   All other systems reviewed and are negative.    Objective:      Physical Exam  Vitals and nursing note reviewed.   Constitutional:       General: He is not in acute distress.     Appearance: Normal appearance. He is well-developed. He is not ill-appearing, toxic-appearing or diaphoretic.   HENT:      Head: Normocephalic.      Right Ear: Tympanic membrane and external ear normal.      Left Ear: Tympanic membrane and external ear normal.      Nose: Nose normal.      Right Sinus: No maxillary sinus tenderness or frontal sinus tenderness.      Left Sinus: No maxillary sinus tenderness or frontal sinus tenderness.      Mouth/Throat:      Pharynx: Posterior oropharyngeal erythema and uvula swelling present. No oropharyngeal exudate.   Eyes:      General:         Right eye: No discharge.         Left eye: No discharge.   Cardiovascular:      Rate and Rhythm: Normal rate and regular rhythm.      Heart sounds: Normal heart sounds. No murmur heard.  Pulmonary:      Effort: Pulmonary effort is

## 2024-05-14 ENCOUNTER — TELEPHONE (OUTPATIENT)
Dept: ONCOLOGY | Age: 59
End: 2024-05-14

## 2024-05-14 DIAGNOSIS — Z87.891 PERSONAL HISTORY OF NICOTINE DEPENDENCE: Primary | ICD-10-CM

## 2024-05-14 NOTE — TELEPHONE ENCOUNTER
Our records indicate that your patient is coming due for their annual lung cancer screening follow up testing.     For your convenience, we have pended the order for the scan for you. If you do not agree with the need for the test, please cancel the order and let us know.     Sincerely,    [facility]   Lung Cancer Screening Program    Auto printed reminder letter sent to patient.

## 2024-06-05 DIAGNOSIS — I10 ESSENTIAL HYPERTENSION: ICD-10-CM

## 2024-06-05 DIAGNOSIS — E78.2 MIXED HYPERLIPIDEMIA: ICD-10-CM

## 2024-06-05 RX ORDER — HYDROCHLOROTHIAZIDE 25 MG/1
25 TABLET ORAL EVERY MORNING
Qty: 90 TABLET | Refills: 0 | Status: SHIPPED | OUTPATIENT
Start: 2024-06-05

## 2024-06-05 RX ORDER — LISINOPRIL 20 MG/1
20 TABLET ORAL DAILY
Qty: 90 TABLET | Refills: 0 | Status: SHIPPED | OUTPATIENT
Start: 2024-06-05

## 2024-06-05 RX ORDER — ATORVASTATIN CALCIUM 20 MG/1
20 TABLET, FILM COATED ORAL DAILY
Qty: 90 TABLET | Refills: 0 | Status: SHIPPED | OUTPATIENT
Start: 2024-06-05

## 2024-06-20 ENCOUNTER — HOSPITAL ENCOUNTER (OUTPATIENT)
Age: 59
Setting detail: SPECIMEN
Discharge: HOME | End: 2024-06-20

## 2024-06-20 ENCOUNTER — OFFICE VISIT (OUTPATIENT)
Dept: FAMILY MEDICINE CLINIC | Age: 59
End: 2024-06-20
Payer: COMMERCIAL

## 2024-06-20 VITALS
BODY MASS INDEX: 23.5 KG/M2 | SYSTOLIC BLOOD PRESSURE: 130 MMHG | DIASTOLIC BLOOD PRESSURE: 82 MMHG | WEIGHT: 193 LBS | HEART RATE: 85 BPM | OXYGEN SATURATION: 98 % | TEMPERATURE: 98.6 F | HEIGHT: 76 IN

## 2024-06-20 DIAGNOSIS — E78.2 MIXED HYPERLIPIDEMIA: ICD-10-CM

## 2024-06-20 DIAGNOSIS — Z11.59 NEED FOR HEPATITIS C SCREENING TEST: ICD-10-CM

## 2024-06-20 DIAGNOSIS — I10 ESSENTIAL HYPERTENSION: Primary | ICD-10-CM

## 2024-06-20 DIAGNOSIS — Z13.1 SCREENING FOR DIABETES MELLITUS: ICD-10-CM

## 2024-06-20 DIAGNOSIS — Z28.21 PNEUMOCOCCAL VACCINATION DECLINED: ICD-10-CM

## 2024-06-20 DIAGNOSIS — Z12.5 SCREENING FOR PROSTATE CANCER: ICD-10-CM

## 2024-06-20 DIAGNOSIS — Z72.0 TOBACCO ABUSE: ICD-10-CM

## 2024-06-20 LAB
BASOPHILS # BLD: 0.05 K/UL (ref 0–0.2)
BASOPHILS NFR BLD: 1 % (ref 0–2)
EOSINOPHIL # BLD: 0.23 K/UL (ref 0–0.44)
EOSINOPHILS RELATIVE PERCENT: 2 % (ref 1–4)
ERYTHROCYTE [DISTWIDTH] IN BLOOD BY AUTOMATED COUNT: 14.6 % (ref 11.8–14.4)
EST. AVERAGE GLUCOSE BLD GHB EST-MCNC: 117 MG/DL
HBA1C MFR BLD: 5.7 % (ref 4–6)
HCT VFR BLD AUTO: 39.9 % (ref 40.7–50.3)
HGB BLD-MCNC: 13.7 G/DL (ref 13–17)
IMM GRANULOCYTES # BLD AUTO: 0.05 K/UL (ref 0–0.3)
IMM GRANULOCYTES NFR BLD: 1 %
LYMPHOCYTES NFR BLD: 2.34 K/UL (ref 1.1–3.7)
LYMPHOCYTES RELATIVE PERCENT: 24 % (ref 24–43)
MCH RBC QN AUTO: 32.9 PG (ref 25.2–33.5)
MCHC RBC AUTO-ENTMCNC: 34.3 G/DL (ref 28.4–34.8)
MCV RBC AUTO: 95.7 FL (ref 82.6–102.9)
MONOCYTES NFR BLD: 0.74 K/UL (ref 0.1–1.2)
MONOCYTES NFR BLD: 8 % (ref 3–12)
NEUTROPHILS NFR BLD: 64 % (ref 36–65)
NEUTS SEG NFR BLD: 6.17 K/UL (ref 1.5–8.1)
NRBC BLD-RTO: 0 PER 100 WBC
PLATELET # BLD AUTO: 272 K/UL (ref 138–453)
PMV BLD AUTO: 10.1 FL (ref 8.1–13.5)
RBC # BLD AUTO: 4.17 M/UL (ref 4.21–5.77)
RBC # BLD: ABNORMAL 10*6/UL
WBC OTHER # BLD: 9.6 K/UL (ref 3.5–11.3)

## 2024-06-20 PROCEDURE — 99213 OFFICE O/P EST LOW 20 MIN: CPT | Performed by: PHYSICIAN ASSISTANT

## 2024-06-20 PROCEDURE — 3075F SYST BP GE 130 - 139MM HG: CPT | Performed by: PHYSICIAN ASSISTANT

## 2024-06-20 PROCEDURE — 3079F DIAST BP 80-89 MM HG: CPT | Performed by: PHYSICIAN ASSISTANT

## 2024-06-20 RX ORDER — HYDROCHLOROTHIAZIDE 25 MG/1
25 TABLET ORAL EVERY MORNING
Qty: 90 TABLET | Refills: 1 | Status: SHIPPED | OUTPATIENT
Start: 2024-06-20

## 2024-06-20 RX ORDER — ATORVASTATIN CALCIUM 20 MG/1
20 TABLET, FILM COATED ORAL DAILY
Qty: 90 TABLET | Refills: 1 | Status: SHIPPED | OUTPATIENT
Start: 2024-06-20

## 2024-06-20 RX ORDER — LISINOPRIL 20 MG/1
20 TABLET ORAL DAILY
Qty: 90 TABLET | Refills: 1 | Status: SHIPPED | OUTPATIENT
Start: 2024-06-20

## 2024-06-20 SDOH — ECONOMIC STABILITY: INCOME INSECURITY: HOW HARD IS IT FOR YOU TO PAY FOR THE VERY BASICS LIKE FOOD, HOUSING, MEDICAL CARE, AND HEATING?: NOT HARD AT ALL

## 2024-06-20 SDOH — ECONOMIC STABILITY: FOOD INSECURITY: WITHIN THE PAST 12 MONTHS, THE FOOD YOU BOUGHT JUST DIDN'T LAST AND YOU DIDN'T HAVE MONEY TO GET MORE.: NEVER TRUE

## 2024-06-20 SDOH — ECONOMIC STABILITY: FOOD INSECURITY: WITHIN THE PAST 12 MONTHS, YOU WORRIED THAT YOUR FOOD WOULD RUN OUT BEFORE YOU GOT MONEY TO BUY MORE.: NEVER TRUE

## 2024-06-20 ASSESSMENT — ENCOUNTER SYMPTOMS
NAUSEA: 0
CONSTIPATION: 0
DIARRHEA: 0
ABDOMINAL PAIN: 0
COLOR CHANGE: 0
WHEEZING: 0
VOMITING: 0
SHORTNESS OF BREATH: 0
COUGH: 0

## 2024-06-20 ASSESSMENT — PATIENT HEALTH QUESTIONNAIRE - PHQ9
SUM OF ALL RESPONSES TO PHQ QUESTIONS 1-9: 0
1. LITTLE INTEREST OR PLEASURE IN DOING THINGS: NOT AT ALL
SUM OF ALL RESPONSES TO PHQ QUESTIONS 1-9: 0
SUM OF ALL RESPONSES TO PHQ QUESTIONS 1-9: 0
SUM OF ALL RESPONSES TO PHQ9 QUESTIONS 1 & 2: 0
SUM OF ALL RESPONSES TO PHQ QUESTIONS 1-9: 0
2. FEELING DOWN, DEPRESSED OR HOPELESS: NOT AT ALL

## 2024-06-20 NOTE — PROGRESS NOTES
Visit Information    Have you changed or started any medications since your last visit including any over-the-counter medicines, vitamins, or herbal medicines? no   Are you having any side effects from any of your medications? -  no  Have you stopped taking any of your medications? Is so, why? -  no    Have you seen any other physician or provider since your last visit? No  Have you had any other diagnostic tests since your last visit? No  Have you been seen in the emergency room and/or had an admission to a hospital since we last saw you? No  Have you had your routine dental cleaning in the past 6 months? no    Have you activated your [employer] account? If not, what are your barriers? Yes     Patient Care Team:  Sylwia Weber PA-C as PCP - General (Family Medicine)  Sylwia Weber PA-C as PCP - Empaneled Provider  Tatiana Hernandez DPM as Physician (Podiatry)    Medical History Review  Past Medical, Family, and Social History reviewed and  contribute to the patient presenting condition    Health Maintenance   Topic Date Due    Hepatitis B vaccine (1 of 3 - 3-dose series) Never done    COVID-19 Vaccine (1) Never done    HIV screen  Never done    Hepatitis C screen  Never done    DTaP/Tdap/Td vaccine (1 - Tdap) Never done    Shingles vaccine (1 of 2) Never done    Pneumococcal 0-64 years Vaccine (2 of 2 - PCV) 09/17/2016    Lipids  10/19/2022    Colorectal Cancer Screen  01/20/2024    Low dose CT lung screening &/or counseling  06/14/2024    Depression Screen  06/05/2024    Flu vaccine (Season Ended) 08/01/2024    Hepatitis A vaccine  Aged Out    Hib vaccine  Aged Out    Polio vaccine  Aged Out    Meningococcal (ACWY) vaccine  Aged Out    Prostate Specific Antigen (PSA) Screening or Monitoring  Discontinued

## 2024-06-20 NOTE — PROGRESS NOTES
[facility]  [facility]  [address] [facility] [address]  Dept: [phone]  Dept Fax: [phone]    Ifeoma Leonard is a 58 y.o. male who presents today for his medical conditions/complaintsas noted below.  Ifeoma Leonard is c/o of   Chief Complaint   Patient presents with    Hypertension       HPI:     Hypertension  This is a chronic problem. The current episode started more than 1 year ago. The problem is unchanged. The problem is controlled. Pertinent negatives include no chest pain, palpitations or shortness of breath. Risk factors for coronary artery disease include dyslipidemia, family history and male gender. Past treatments include ACE inhibitors and diuretics. The current treatment provides significant improvement. There are no compliance problems.    Patient reports feeling well. Needing refills to pharmacy  Continues to smoke around 1 ppd    No results found for: \"LABA1C\"          ( goal A1Cis < 7)   No components found for: \"LABMICR\"  No components found for: \"LDLCHOLESTEROL\", \"LDLCALC\"    (goal LDL is <100)   AST (U/L)   Date Value   10/19/2021 21     ALT (U/L)   Date Value   10/19/2021 30     BUN (mg/dL)   Date Value   12/19/2023 29 (H)     BP Readings from Last 3 Encounters:   06/20/24 130/82   04/18/24 117/80   12/19/23 110/72          (goal 120/80)    Past Medical History:   Diagnosis Date    Hyperlipidemia     Hypertension     TIA (transient ischemic attack)     Early 1990's    Wears dentures     Upper only at present time.    Wears glasses       Past Surgical History:   Procedure Laterality Date    EYE SURGERY      Removal of foreign object in Right eye in 1980's per pt.    ROTATOR CUFF REPAIR Left 2011    SHOULDER ARTHROSCOPY Left 9/15/2020    LEFT SHOULDER ARTHROSCOPY WITH ROTATOR CUFF REPAIR SUPERIOR CAPSULAR RECONSTRUCTION EXTENSIVE DEBRIDEMENT performed by Kamran Maharaj DO at [facility] OR       Family History   Problem Relation Age of Onset

## 2024-06-21 LAB
ALBUMIN SERPL-MCNC: 4.5 G/DL (ref 3.5–5.2)
ALBUMIN/GLOB SERPL: 2 {RATIO} (ref 1–2.5)
ALP SERPL-CCNC: 74 U/L (ref 40–129)
ALT SERPL-CCNC: 29 U/L (ref 10–50)
ANION GAP SERPL CALCULATED.3IONS-SCNC: 14 MMOL/L (ref 9–16)
AST SERPL-CCNC: 28 U/L (ref 10–50)
BILIRUB SERPL-MCNC: 0.4 MG/DL (ref 0–1.2)
BUN SERPL-MCNC: 15 MG/DL (ref 6–20)
CALCIUM SERPL-MCNC: 9.4 MG/DL (ref 8.6–10.4)
CHLORIDE SERPL-SCNC: 105 MMOL/L (ref 98–107)
CHOLEST SERPL-MCNC: 156 MG/DL (ref 0–199)
CHOLESTEROL/HDL RATIO: 3
CO2 SERPL-SCNC: 20 MMOL/L (ref 20–31)
CREAT SERPL-MCNC: 0.9 MG/DL (ref 0.7–1.2)
GFR, ESTIMATED: >90 ML/MIN/1.73M2
GLUCOSE SERPL-MCNC: 74 MG/DL (ref 74–99)
HCV AB SERPL QL IA: NONREACTIVE
HDLC SERPL-MCNC: 45 MG/DL
LDLC SERPL CALC-MCNC: 71 MG/DL (ref 0–100)
POTASSIUM SERPL-SCNC: 4.3 MMOL/L (ref 3.7–5.3)
PROT SERPL-MCNC: 6.9 G/DL (ref 6.6–8.7)
PSA SERPL-MCNC: 0.9 NG/ML (ref 0–4)
SODIUM SERPL-SCNC: 139 MMOL/L (ref 136–145)
TRIGL SERPL-MCNC: 204 MG/DL
VLDLC SERPL CALC-MCNC: 41 MG/DL

## 2024-09-26 ENCOUNTER — OFFICE VISIT (OUTPATIENT)
Dept: PRIMARY CARE CLINIC | Age: 59
End: 2024-09-26
Payer: COMMERCIAL

## 2024-09-26 VITALS — OXYGEN SATURATION: 98 % | HEART RATE: 71 BPM

## 2024-09-26 DIAGNOSIS — R07.89 MUSCULOSKELETAL CHEST PAIN: Primary | ICD-10-CM

## 2024-09-26 DIAGNOSIS — R07.9 RIGHT-SIDED CHEST PAIN: ICD-10-CM

## 2024-09-26 PROCEDURE — 99213 OFFICE O/P EST LOW 20 MIN: CPT | Performed by: NURSE PRACTITIONER

## 2024-09-26 RX ORDER — CYCLOBENZAPRINE HCL 5 MG
5 TABLET ORAL 3 TIMES DAILY PRN
Qty: 20 TABLET | Refills: 0 | Status: SHIPPED | OUTPATIENT
Start: 2024-09-26 | End: 2024-10-06

## 2024-09-26 RX ORDER — PREDNISONE 20 MG/1
40 TABLET ORAL DAILY
Qty: 10 TABLET | Refills: 0 | Status: SHIPPED | OUTPATIENT
Start: 2024-09-26 | End: 2024-10-01

## 2024-09-26 ASSESSMENT — ENCOUNTER SYMPTOMS
RESPIRATORY NEGATIVE: 1
COUGH: 0
SHORTNESS OF BREATH: 0
CHEST TIGHTNESS: 0
ORTHOPNEA: 0
ABDOMINAL PAIN: 0
WHEEZING: 0
NAUSEA: 0
SPUTUM PRODUCTION: 0

## 2024-11-05 DIAGNOSIS — I10 ESSENTIAL HYPERTENSION: ICD-10-CM

## 2024-11-06 RX ORDER — HYDROCHLOROTHIAZIDE 25 MG/1
25 TABLET ORAL EVERY MORNING
Qty: 90 TABLET | Refills: 3 | Status: SHIPPED | OUTPATIENT
Start: 2024-11-06

## 2024-11-06 RX ORDER — LISINOPRIL 20 MG/1
20 TABLET ORAL DAILY
Qty: 90 TABLET | Refills: 3 | Status: SHIPPED | OUTPATIENT
Start: 2024-11-06

## 2024-11-26 DIAGNOSIS — E78.2 MIXED HYPERLIPIDEMIA: ICD-10-CM

## 2024-11-26 RX ORDER — ATORVASTATIN CALCIUM 20 MG/1
20 TABLET, FILM COATED ORAL DAILY
Qty: 90 TABLET | Refills: 3 | Status: SHIPPED | OUTPATIENT
Start: 2024-11-26

## 2025-01-14 ENCOUNTER — OFFICE VISIT (OUTPATIENT)
Dept: FAMILY MEDICINE CLINIC | Age: 60
End: 2025-01-14
Payer: COMMERCIAL

## 2025-01-14 ENCOUNTER — HOSPITAL ENCOUNTER (OUTPATIENT)
Age: 60
Setting detail: SPECIMEN
Discharge: HOME | End: 2025-01-14

## 2025-01-14 VITALS
DIASTOLIC BLOOD PRESSURE: 82 MMHG | HEART RATE: 79 BPM | HEIGHT: 76 IN | BODY MASS INDEX: 24.36 KG/M2 | OXYGEN SATURATION: 97 % | SYSTOLIC BLOOD PRESSURE: 128 MMHG | WEIGHT: 200 LBS | TEMPERATURE: 97.5 F

## 2025-01-14 DIAGNOSIS — E78.2 MIXED HYPERLIPIDEMIA: ICD-10-CM

## 2025-01-14 DIAGNOSIS — Z12.11 SCREENING FOR COLON CANCER: ICD-10-CM

## 2025-01-14 DIAGNOSIS — E78.2 MIXED HYPERLIPIDEMIA: Primary | ICD-10-CM

## 2025-01-14 DIAGNOSIS — I10 ESSENTIAL HYPERTENSION: ICD-10-CM

## 2025-01-14 DIAGNOSIS — K21.9 GASTROESOPHAGEAL REFLUX DISEASE WITHOUT ESOPHAGITIS: ICD-10-CM

## 2025-01-14 DIAGNOSIS — N52.9 ERECTILE DYSFUNCTION, UNSPECIFIED ERECTILE DYSFUNCTION TYPE: ICD-10-CM

## 2025-01-14 DIAGNOSIS — Z72.0 TOBACCO ABUSE: ICD-10-CM

## 2025-01-14 LAB
ALBUMIN SERPL-MCNC: 4.5 G/DL (ref 3.5–5.2)
ALBUMIN/GLOB SERPL: 1.7 {RATIO} (ref 1–2.5)
ALP SERPL-CCNC: 87 U/L (ref 40–129)
ALT SERPL-CCNC: 25 U/L (ref 10–50)
ANION GAP SERPL CALCULATED.3IONS-SCNC: 11 MMOL/L (ref 9–16)
AST SERPL-CCNC: 23 U/L (ref 10–50)
BILIRUB SERPL-MCNC: 0.4 MG/DL (ref 0–1.2)
BUN SERPL-MCNC: 16 MG/DL (ref 8–23)
CALCIUM SERPL-MCNC: 9.3 MG/DL (ref 8.6–10.4)
CHLORIDE SERPL-SCNC: 104 MMOL/L (ref 98–107)
CO2 SERPL-SCNC: 23 MMOL/L (ref 20–31)
CREAT SERPL-MCNC: 0.8 MG/DL (ref 0.7–1.2)
GFR, ESTIMATED: >90 ML/MIN/1.73M2
GLUCOSE SERPL-MCNC: 85 MG/DL (ref 74–99)
POTASSIUM SERPL-SCNC: 4.5 MMOL/L (ref 3.7–5.3)
PROT SERPL-MCNC: 7.1 G/DL (ref 6.6–8.7)
SODIUM SERPL-SCNC: 138 MMOL/L (ref 136–145)
TRIGL SERPL-MCNC: 319 MG/DL

## 2025-01-14 PROCEDURE — 99214 OFFICE O/P EST MOD 30 MIN: CPT | Performed by: PHYSICIAN ASSISTANT

## 2025-01-14 PROCEDURE — 3074F SYST BP LT 130 MM HG: CPT | Performed by: PHYSICIAN ASSISTANT

## 2025-01-14 PROCEDURE — 3079F DIAST BP 80-89 MM HG: CPT | Performed by: PHYSICIAN ASSISTANT

## 2025-01-14 RX ORDER — SILDENAFIL 50 MG/1
50 TABLET, FILM COATED ORAL DAILY PRN
Qty: 30 TABLET | Refills: 1 | Status: SHIPPED | OUTPATIENT
Start: 2025-01-14

## 2025-01-14 SDOH — ECONOMIC STABILITY: FOOD INSECURITY: WITHIN THE PAST 12 MONTHS, THE FOOD YOU BOUGHT JUST DIDN'T LAST AND YOU DIDN'T HAVE MONEY TO GET MORE.: NEVER TRUE

## 2025-01-14 SDOH — ECONOMIC STABILITY: FOOD INSECURITY: WITHIN THE PAST 12 MONTHS, YOU WORRIED THAT YOUR FOOD WOULD RUN OUT BEFORE YOU GOT MONEY TO BUY MORE.: NEVER TRUE

## 2025-01-14 ASSESSMENT — ENCOUNTER SYMPTOMS
DIARRHEA: 0
COUGH: 0
NAUSEA: 0
VOMITING: 0
ABDOMINAL PAIN: 0
COLOR CHANGE: 0
CONSTIPATION: 0
WHEEZING: 0
SHORTNESS OF BREATH: 0

## 2025-01-14 ASSESSMENT — PATIENT HEALTH QUESTIONNAIRE - PHQ9
SUM OF ALL RESPONSES TO PHQ QUESTIONS 1-9: 0
SUM OF ALL RESPONSES TO PHQ QUESTIONS 1-9: 0
1. LITTLE INTEREST OR PLEASURE IN DOING THINGS: NOT AT ALL
SUM OF ALL RESPONSES TO PHQ9 QUESTIONS 1 & 2: 0
SUM OF ALL RESPONSES TO PHQ QUESTIONS 1-9: 0
SUM OF ALL RESPONSES TO PHQ QUESTIONS 1-9: 0
2. FEELING DOWN, DEPRESSED OR HOPELESS: NOT AT ALL

## 2025-01-14 NOTE — PROGRESS NOTES
[facility]  [facility]  [address] [address]  Dept: [phone]  Dept Fax: [phone]    Ifeoma Leonard is a 59 y.o. male who presents today for his medical conditions/complaintsas noted below.  Ifeoma Leonard is c/o of   Chief Complaint   Patient presents with    Hypertension         HPI:     Hypertension  This is a chronic problem. The current episode started more than 1 year ago. The problem is unchanged. The problem is controlled. Pertinent negatives include no chest pain, palpitations or shortness of breath. Risk factors for coronary artery disease include family history and male gender. Past treatments include ACE inhibitors and diuretics. The current treatment provides moderate improvement.   Patient states he has been continuing to smoke. Working on smoking cessation. Presently slowly cutting back. Down from 1.5 ppd to 1. Patient states he has noticed GERD symptoms. He feels burning especially when he lays down.   Patient used OTC pepto liquid which helped a little but seemed to darken his stools.  Patient also reports concerns of erectile dysfunction.  Patient states he is struggling with the ability to obtain and sustain an erection.  He states this is slowly been worsening over the last year.  He is requesting medication to try to help this issue.  He has not used any ED medications in the past    Lab Results   Component Value Date    WBC 9.6 06/20/2024    HGB 13.7 06/20/2024    HCT 39.9 (L) 06/20/2024    MCV 95.7 06/20/2024     06/20/2024     Lab Results   Component Value Date     06/20/2024    K 4.3 06/20/2024     06/20/2024    CO2 20 06/20/2024    BUN 15 06/20/2024    CREATININE 0.9 06/20/2024    GLUCOSE 74 06/20/2024    CALCIUM 9.4 06/20/2024    BILITOT 0.4 06/20/2024    ALKPHOS 74 06/20/2024    AST 28 06/20/2024    ALT 29 06/20/2024    LABGLOM >90 06/20/2024    GFRAA >60 10/19/2021       Lab Results   Component Value Date    CHOL 156

## 2025-01-15 ENCOUNTER — PATIENT MESSAGE (OUTPATIENT)
Dept: FAMILY MEDICINE CLINIC | Age: 60
End: 2025-01-15

## 2025-01-15 DIAGNOSIS — Z12.11 COLON CANCER SCREENING: Primary | ICD-10-CM

## 2025-01-15 LAB
BASOPHILS # BLD: 0.1 K/UL (ref 0–0.2)
BASOPHILS NFR BLD: 1 % (ref 0–2)
EOSINOPHIL # BLD: 0.21 K/UL (ref 0–0.44)
EOSINOPHILS RELATIVE PERCENT: 2 % (ref 1–4)
ERYTHROCYTE [DISTWIDTH] IN BLOOD BY AUTOMATED COUNT: 14 % (ref 11.8–14.4)
HCT VFR BLD AUTO: 43.2 % (ref 40.7–50.3)
HGB BLD-MCNC: 14.3 G/DL (ref 13–17)
IMM GRANULOCYTES # BLD AUTO: 0.1 K/UL (ref 0–0.3)
IMM GRANULOCYTES NFR BLD: 1 %
LYMPHOCYTES NFR BLD: 2.78 K/UL (ref 1.1–3.7)
LYMPHOCYTES RELATIVE PERCENT: 27 % (ref 24–43)
MCH RBC QN AUTO: 31.8 PG (ref 25.2–33.5)
MCHC RBC AUTO-ENTMCNC: 33.1 G/DL (ref 28.4–34.8)
MCV RBC AUTO: 96 FL (ref 82.6–102.9)
MONOCYTES NFR BLD: 0.72 K/UL (ref 0.1–1.2)
MONOCYTES NFR BLD: 7 % (ref 3–12)
MORPHOLOGY: NORMAL
NEUTROPHILS NFR BLD: 62 % (ref 36–65)
NEUTS SEG NFR BLD: 6.39 K/UL (ref 1.5–8.1)
NRBC BLD-RTO: 0 PER 100 WBC
PLATELET # BLD AUTO: 299 K/UL (ref 138–453)
PLATELET, FLUORESCENCE: 299 K/UL (ref 138–453)
PMV BLD AUTO: 10.3 FL (ref 8.1–13.5)
RBC # BLD AUTO: 4.5 M/UL (ref 4.21–5.77)
WBC OTHER # BLD: 10.3 K/UL (ref 3.5–11.3)

## 2025-02-27 DIAGNOSIS — K21.9 GASTROESOPHAGEAL REFLUX DISEASE WITHOUT ESOPHAGITIS: ICD-10-CM

## 2025-02-27 RX ORDER — OMEPRAZOLE 20 MG/1
20 CAPSULE, DELAYED RELEASE ORAL
Qty: 90 CAPSULE | Refills: 0 | Status: SHIPPED | OUTPATIENT
Start: 2025-02-27 | End: 2025-05-28

## 2025-03-20 LAB — NONINV COLON CA DNA+OCC BLD SCRN STL QL: NEGATIVE

## 2025-03-21 ENCOUNTER — RESULTS FOLLOW-UP (OUTPATIENT)
Dept: FAMILY MEDICINE CLINIC | Age: 60
End: 2025-03-21

## 2025-07-02 DIAGNOSIS — K21.9 GASTROESOPHAGEAL REFLUX DISEASE WITHOUT ESOPHAGITIS: ICD-10-CM

## 2025-07-02 RX ORDER — OMEPRAZOLE 20 MG/1
20 CAPSULE, DELAYED RELEASE ORAL
Qty: 90 CAPSULE | Refills: 0 | Status: SHIPPED | OUTPATIENT
Start: 2025-07-02

## 2025-07-14 ENCOUNTER — OFFICE VISIT (OUTPATIENT)
Dept: FAMILY MEDICINE CLINIC | Age: 60
End: 2025-07-14
Payer: COMMERCIAL

## 2025-07-14 VITALS
TEMPERATURE: 97 F | BODY MASS INDEX: 24.23 KG/M2 | OXYGEN SATURATION: 98 % | SYSTOLIC BLOOD PRESSURE: 104 MMHG | HEIGHT: 76 IN | HEART RATE: 91 BPM | DIASTOLIC BLOOD PRESSURE: 64 MMHG | WEIGHT: 199 LBS

## 2025-07-14 DIAGNOSIS — Z12.5 SCREENING FOR PROSTATE CANCER: ICD-10-CM

## 2025-07-14 DIAGNOSIS — F10.90 ALCOHOL USE: ICD-10-CM

## 2025-07-14 DIAGNOSIS — R19.4 FREQUENT BOWEL MOVEMENTS: ICD-10-CM

## 2025-07-14 DIAGNOSIS — I10 ESSENTIAL HYPERTENSION: Primary | ICD-10-CM

## 2025-07-14 DIAGNOSIS — R19.5 CHANGE IN CONSISTENCY OF STOOL: ICD-10-CM

## 2025-07-14 DIAGNOSIS — Z72.0 TOBACCO ABUSE: ICD-10-CM

## 2025-07-14 DIAGNOSIS — R39.12 WEAK URINARY STREAM: ICD-10-CM

## 2025-07-14 DIAGNOSIS — Z23 NEED FOR PNEUMOCOCCAL 20-VALENT CONJUGATE VACCINATION: ICD-10-CM

## 2025-07-14 DIAGNOSIS — Z87.891 PERSONAL HISTORY OF TOBACCO USE: ICD-10-CM

## 2025-07-14 DIAGNOSIS — E78.2 MIXED HYPERLIPIDEMIA: ICD-10-CM

## 2025-07-14 DIAGNOSIS — Z13.1 SCREENING FOR DIABETES MELLITUS: ICD-10-CM

## 2025-07-14 PROCEDURE — 99214 OFFICE O/P EST MOD 30 MIN: CPT | Performed by: PHYSICIAN ASSISTANT

## 2025-07-14 PROCEDURE — G0296 VISIT TO DETERM LDCT ELIG: HCPCS | Performed by: PHYSICIAN ASSISTANT

## 2025-07-14 PROCEDURE — 3078F DIAST BP <80 MM HG: CPT | Performed by: PHYSICIAN ASSISTANT

## 2025-07-14 PROCEDURE — 3074F SYST BP LT 130 MM HG: CPT | Performed by: PHYSICIAN ASSISTANT

## 2025-07-14 PROCEDURE — 90677 PCV20 VACCINE IM: CPT | Performed by: PHYSICIAN ASSISTANT

## 2025-07-14 PROCEDURE — 90471 IMMUNIZATION ADMIN: CPT | Performed by: PHYSICIAN ASSISTANT

## 2025-07-14 RX ORDER — LISINOPRIL 10 MG/1
10 TABLET ORAL DAILY
Qty: 90 TABLET | Refills: 1 | Status: SHIPPED | OUTPATIENT
Start: 2025-07-14

## 2025-07-14 ASSESSMENT — ENCOUNTER SYMPTOMS
CONSTIPATION: 0
SHORTNESS OF BREATH: 0
DIARRHEA: 1
NAUSEA: 0
COLOR CHANGE: 0
COUGH: 0
ABDOMINAL PAIN: 0
WHEEZING: 0
VOMITING: 0

## 2025-07-14 NOTE — PROGRESS NOTES
7/14/2026    Lipid Panel     Standing Status:   Future     Expected Date:   7/14/2025     Expiration Date:   7/14/2026    Hemoglobin A1C     Standing Status:   Future     Expected Date:   7/14/2025     Expiration Date:   7/14/2026    Basic Metabolic Panel     Standing Status:   Future     Expected Date:   7/14/2025     Expiration Date:   7/14/2026    Urinalysis with Reflex to Culture     Standing Status:   Future     Expected Date:   7/14/2025     Expiration Date:   7/14/2026     SPECIFY(EX-CATH,MIDSTREAM,CYSTO,ETC)?:   mid    [facility] - Mick Ulloa MD, Urology, [place]     Referral Priority:   Routine     Referral Type:   Eval and Treat     Referral Reason:   Specialty Services Required     Referred to Provider:   Carl Ulloa MD     Requested Specialty:   Urology     Number of Visits Requested:   1    [facility] - Hu Zavala DO, Gastroenterology, [place]     Referral Priority:   Routine     Referral Type:   Eval and Treat     Referral Reason:   Specialty Services Required     Referred to Provider:   Hu Zavala DO     Requested Specialty:   Gastroenterology     Number of Visits Requested:   1    CA VISIT TO DISCUSS LUNG CA SCREEN W LDCT     Orders Placed This Encounter   Medications    lisinopril (PRINIVIL;ZESTRIL) 10 MG tablet     Sig: Take 1 tablet by mouth daily     Dispense:  90 tablet     Refill:  1       Patient given educational materials - see patient instructions.Discussed use, benefit, and side effects of prescribed medications.  All patientquestions answered. Pt voiced understanding. Reviewed health maintenance.  Instructedto continue current medications, diet and exercise.  Patient agreed with treatmentplan. Follow up as directed.       Please note that this chart was generated using voice recognition Dragon dictation software.  Although every effort was made to ensure the accuracy of this automated transcription, some errors in transcription may have occurred.     Electronically signed by

## 2025-08-25 ENCOUNTER — OFFICE VISIT (OUTPATIENT)
Age: 60
End: 2025-08-25
Payer: COMMERCIAL

## 2025-08-25 VITALS — BODY MASS INDEX: 24.36 KG/M2 | HEIGHT: 76 IN | WEIGHT: 200 LBS

## 2025-08-25 DIAGNOSIS — N40.1 BPH WITH OBSTRUCTION/LOWER URINARY TRACT SYMPTOMS: Primary | ICD-10-CM

## 2025-08-25 DIAGNOSIS — R39.12 WEAK URINARY STREAM: ICD-10-CM

## 2025-08-25 DIAGNOSIS — N13.8 BPH WITH OBSTRUCTION/LOWER URINARY TRACT SYMPTOMS: Primary | ICD-10-CM

## 2025-08-25 DIAGNOSIS — R35.1 NOCTURIA: ICD-10-CM

## 2025-08-25 LAB
BILIRUBIN, POC: NORMAL
BLOOD URINE, POC: NORMAL
CLARITY, POC: CLEAR
COLOR, POC: YELLOW
GLUCOSE URINE, POC: NORMAL MG/DL
KETONES, POC: NORMAL
LEUKOCYTE EST, POC: NORMAL
NITRITE, POC: NORMAL
PH, POC: NORMAL
PROTEIN, POC: NORMAL MG/DL
SPECIFIC GRAVITY, POC: NORMAL
UROBILINOGEN, POC: NORMAL

## 2025-08-25 PROCEDURE — 51798 US URINE CAPACITY MEASURE: CPT | Performed by: SPECIALIST

## 2025-08-25 PROCEDURE — 81003 URINALYSIS AUTO W/O SCOPE: CPT | Performed by: SPECIALIST

## 2025-08-25 PROCEDURE — 99244 OFF/OP CNSLTJ NEW/EST MOD 40: CPT | Performed by: SPECIALIST

## 2025-08-25 RX ORDER — ALFUZOSIN HYDROCHLORIDE 10 MG/1
10 TABLET, EXTENDED RELEASE ORAL DAILY
Qty: 90 TABLET | Refills: 3 | Status: SHIPPED | OUTPATIENT
Start: 2025-08-25

## 2025-08-25 ASSESSMENT — LIFESTYLE VARIABLES
HOW MANY STANDARD DRINKS CONTAINING ALCOHOL DO YOU HAVE ON A TYPICAL DAY: 1 OR 2
HOW OFTEN DO YOU HAVE A DRINK CONTAINING ALCOHOL: 4 OR MORE TIMES A WEEK

## (undated) DEVICE — [STANDARD 12-FLUTE BARREL BUR, ARTHROSCOPIC SHAVER BLADE,  DO NOT RESTERILIZE,  DO NOT USE IF PACKAGE IS DAMAGED,  KEEP DRY,  KEEP AWAY FROM SUNLIGHT]: Brand: FORMULA

## (undated) DEVICE — SUTURE ETHLN SZ 3-0 L18IN NONABSORBABLE BLK PS-2 L19MM 3/8 1669H

## (undated) DEVICE — GLOVE SURG SZ 75 CRM LTX FREE POLYISOPRENE POLYMER BEAD ANTI

## (undated) DEVICE — GLOVE SURG SZ 8 CRM LTX FREE POLYISOPRENE POLYMER BEAD ANTI

## (undated) DEVICE — TUBING, SUCTION, 1/4" X 12', STRAIGHT: Brand: MEDLINE

## (undated) DEVICE — GOWN,AURORA,NON-REINFORCED,2XL: Brand: MEDLINE

## (undated) DEVICE — GLOVE SURG SZ 8 L12IN FNGR THK79MIL GRN LTX FREE

## (undated) DEVICE — KIT SUT ANCHR INSRT DISPOSABLE OSABLES 18MM

## (undated) DEVICE — NEEDLE SUT PASS FOR ROT CUF LABRAL REP SUREFIRE SCORPION

## (undated) DEVICE — SUTURE VCRL SZ 0 L36IN ABSRB UD L36MM CT-1 1/2 CIR J946H

## (undated) DEVICE — DUP USE 414359 KIT 3.0MM KNOTLESS PERC INSERT

## (undated) DEVICE — GLOVE SURG SZ 65 CRM LTX FREE POLYISOPRENE POLYMER BEAD ANTI

## (undated) DEVICE — GOWN,SIRUS,NON REINFRCD,LARGE,SET IN SL: Brand: MEDLINE

## (undated) DEVICE — SUTURE TIGERLINK SZ 2 L26IN NONABSORBABLE WHT BLK CLS LOOP AR7235T

## (undated) DEVICE — COVER,MAYO STAND,XL,STERILE: Brand: MEDLINE

## (undated) DEVICE — APPLICATOR MEDICATED 26 CC SOLUTION HI LT ORNG CHLORAPREP

## (undated) DEVICE — DRAPE,REIN 53X77,STERILE: Brand: MEDLINE

## (undated) DEVICE — DRAPE,U/ SHT,SPLIT,PLAS,STERIL: Brand: MEDLINE

## (undated) DEVICE — GLOVE SURG SZ 7 L12IN FNGR THK79MIL GRN LTX FREE

## (undated) DEVICE — SUTURE FIBERLINK SZ 2-0 L26IN NONABSORBABLE BLU CLS LOOP AR7235

## (undated) DEVICE — SUPER TURBOVAC 90 INTEGRATED CABLE WAND ICW: Brand: COBLATION

## (undated) DEVICE — BLANKET WRM W40.2XL55.9IN IORT LO BODY + MISTRAL AIR

## (undated) DEVICE — TOTAL TRAY, DB, 100% SILI FOLEY, 16FR 10: Brand: MEDLINE

## (undated) DEVICE — TUBING FLD MGMT Y DBL SPIK DUALWAVE

## (undated) DEVICE — GOWN,SIRUS,NONRNF,SETINSLV,XL,20/CS: Brand: MEDLINE

## (undated) DEVICE — Device

## (undated) DEVICE — GLOVE SURG SZ 65 L12IN FNGR THK79MIL GRN LTX FREE

## (undated) DEVICE — GLOVE SURG SZ 85 CRM LTX FREE POLYISOPRENE POLYMER BEAD ANTI

## (undated) DEVICE — GARMENT,MEDLINE,DVT,INT,CALF,MED, GEN2: Brand: MEDLINE

## (undated) DEVICE — DRESSING PETRO W3XL8IN OIL EMUL N ADH GZ KNIT IMPREG CELOS

## (undated) DEVICE — IMMOBILIZER SHLDR L10.5-17IN D7IN SLNG W/ 15DEG ABD PLLW

## (undated) DEVICE — TUBING PMP L16FT MAIN DISP FOR AR-6400 AR-6475

## (undated) DEVICE — ACUFEX ACCESS POSITIONING KIT: Brand: ACUFEX

## (undated) DEVICE — CANNULA ARTHSCP L7CM DIA7MM TRNSLUC THRD FLX W/ NO SQUIRT

## (undated) DEVICE — CANNULA ARTHSCP L7CM ID8.25MM TRNSLUC THRD FLX W/ NO SQUIRT

## (undated) DEVICE — GLOVE ORTHO 8   MSG9480

## (undated) DEVICE — 3M™ STERI-DRAPE™ U-DRAPE 1015: Brand: STERI-DRAPE™

## (undated) DEVICE — SUTURE ABSRB BRAID COAT UD CP NO 2 27IN VCRL J195H

## (undated) DEVICE — TOWEL,OR,DSP,ST,BLUE,DLX,XR,4/PK,20PK/CS: Brand: MEDLINE

## (undated) DEVICE — SUTURE VCRL SZ 2-0 L27IN ABSRB UD L26MM CT-2 1/2 CIR J269H

## (undated) DEVICE — CANNULA ARTHSCP L4CM DIA12MM DBL DAM 1 PC MOLD LO PROF FLNG

## (undated) DEVICE — GLOVE ORTHO 7 1/2   MSG9475